# Patient Record
Sex: FEMALE | Race: WHITE | NOT HISPANIC OR LATINO | Employment: PART TIME | URBAN - METROPOLITAN AREA
[De-identification: names, ages, dates, MRNs, and addresses within clinical notes are randomized per-mention and may not be internally consistent; named-entity substitution may affect disease eponyms.]

---

## 2017-01-05 ENCOUNTER — APPOINTMENT (OUTPATIENT)
Dept: PHYSICAL THERAPY | Facility: CLINIC | Age: 57
End: 2017-01-05
Payer: COMMERCIAL

## 2017-01-05 PROCEDURE — 97162 PT EVAL MOD COMPLEX 30 MIN: CPT | Performed by: PHYSICAL THERAPIST

## 2017-01-05 PROCEDURE — 97162 PT EVAL MOD COMPLEX 30 MIN: CPT

## 2017-01-05 PROCEDURE — 97110 THERAPEUTIC EXERCISES: CPT

## 2017-01-10 ENCOUNTER — APPOINTMENT (OUTPATIENT)
Dept: PHYSICAL THERAPY | Facility: CLINIC | Age: 57
End: 2017-01-10
Payer: COMMERCIAL

## 2017-01-10 PROCEDURE — 97110 THERAPEUTIC EXERCISES: CPT

## 2017-01-10 PROCEDURE — 97140 MANUAL THERAPY 1/> REGIONS: CPT

## 2017-01-12 ENCOUNTER — APPOINTMENT (OUTPATIENT)
Dept: PHYSICAL THERAPY | Facility: CLINIC | Age: 57
End: 2017-01-12
Payer: COMMERCIAL

## 2017-01-12 PROCEDURE — 97110 THERAPEUTIC EXERCISES: CPT

## 2017-01-12 PROCEDURE — 97140 MANUAL THERAPY 1/> REGIONS: CPT

## 2017-01-13 ENCOUNTER — HOSPITAL ENCOUNTER (OUTPATIENT)
Dept: RADIOLOGY | Age: 57
Discharge: HOME | End: 2017-01-13
Payer: COMMERCIAL

## 2017-01-13 DIAGNOSIS — Z12.31 ENCOUNTER FOR SCREENING MAMMOGRAM FOR MALIGNANT NEOPLASM OF BREAST: ICD-10-CM

## 2017-01-13 PROCEDURE — G0202 SCR MAMMO BI INCL CAD: HCPCS

## 2017-01-17 ENCOUNTER — APPOINTMENT (OUTPATIENT)
Dept: PHYSICAL THERAPY | Facility: CLINIC | Age: 57
End: 2017-01-17
Payer: COMMERCIAL

## 2017-01-17 PROCEDURE — 97110 THERAPEUTIC EXERCISES: CPT

## 2017-01-17 PROCEDURE — 97140 MANUAL THERAPY 1/> REGIONS: CPT

## 2017-01-19 ENCOUNTER — APPOINTMENT (OUTPATIENT)
Dept: PHYSICAL THERAPY | Facility: CLINIC | Age: 57
End: 2017-01-19
Payer: COMMERCIAL

## 2017-01-19 PROCEDURE — 97110 THERAPEUTIC EXERCISES: CPT

## 2017-01-19 PROCEDURE — 97140 MANUAL THERAPY 1/> REGIONS: CPT

## 2017-01-24 ENCOUNTER — APPOINTMENT (OUTPATIENT)
Dept: PHYSICAL THERAPY | Facility: CLINIC | Age: 57
End: 2017-01-24
Payer: COMMERCIAL

## 2017-01-24 PROCEDURE — 97140 MANUAL THERAPY 1/> REGIONS: CPT

## 2017-01-24 PROCEDURE — 97110 THERAPEUTIC EXERCISES: CPT

## 2017-01-26 ENCOUNTER — APPOINTMENT (OUTPATIENT)
Dept: PHYSICAL THERAPY | Facility: CLINIC | Age: 57
End: 2017-01-26
Payer: COMMERCIAL

## 2017-01-26 PROCEDURE — 97110 THERAPEUTIC EXERCISES: CPT

## 2017-01-26 PROCEDURE — 97140 MANUAL THERAPY 1/> REGIONS: CPT

## 2017-01-31 ENCOUNTER — APPOINTMENT (OUTPATIENT)
Dept: PHYSICAL THERAPY | Facility: CLINIC | Age: 57
End: 2017-01-31
Payer: COMMERCIAL

## 2017-01-31 PROCEDURE — 97140 MANUAL THERAPY 1/> REGIONS: CPT

## 2017-01-31 PROCEDURE — 97110 THERAPEUTIC EXERCISES: CPT

## 2017-02-02 ENCOUNTER — APPOINTMENT (OUTPATIENT)
Dept: PHYSICAL THERAPY | Facility: CLINIC | Age: 57
End: 2017-02-02
Payer: COMMERCIAL

## 2017-02-02 PROCEDURE — 97140 MANUAL THERAPY 1/> REGIONS: CPT

## 2017-02-02 PROCEDURE — 97110 THERAPEUTIC EXERCISES: CPT

## 2018-01-19 ENCOUNTER — HOSPITAL ENCOUNTER (OUTPATIENT)
Dept: RADIOLOGY | Age: 58
Discharge: HOME | End: 2018-01-19
Payer: COMMERCIAL

## 2018-01-19 DIAGNOSIS — Z12.31 ENCOUNTER FOR SCREENING MAMMOGRAM FOR MALIGNANT NEOPLASM OF BREAST: ICD-10-CM

## 2018-01-19 PROCEDURE — 77067 SCR MAMMO BI INCL CAD: CPT

## 2019-01-24 ENCOUNTER — HOSPITAL ENCOUNTER (OUTPATIENT)
Dept: RADIOLOGY | Age: 59
Discharge: HOME | End: 2019-01-24
Payer: COMMERCIAL

## 2019-01-24 VITALS — BODY MASS INDEX: 28.17 KG/M2 | WEIGHT: 165 LBS | HEIGHT: 64 IN

## 2019-01-24 DIAGNOSIS — Z12.31 ENCOUNTER FOR SCREENING MAMMOGRAM FOR MALIGNANT NEOPLASM OF BREAST: ICD-10-CM

## 2019-01-24 PROCEDURE — 77067 SCR MAMMO BI INCL CAD: CPT

## 2020-01-30 ENCOUNTER — HOSPITAL ENCOUNTER (OUTPATIENT)
Dept: RADIOLOGY | Age: 60
Discharge: HOME | End: 2020-01-30
Payer: COMMERCIAL

## 2020-01-30 VITALS — WEIGHT: 165 LBS | HEIGHT: 64 IN | BODY MASS INDEX: 28.17 KG/M2

## 2020-01-30 DIAGNOSIS — Z12.31 ENCOUNTER FOR SCREENING MAMMOGRAM FOR MALIGNANT NEOPLASM OF BREAST: ICD-10-CM

## 2020-01-30 PROCEDURE — 77063 BREAST TOMOSYNTHESIS BI: CPT

## 2020-01-30 PROCEDURE — 77067 SCR MAMMO BI INCL CAD: CPT

## 2020-05-29 ENCOUNTER — TRANSCRIBE ORDERS (OUTPATIENT)
Dept: PHYSICAL THERAPY | Facility: CLINIC | Age: 60
End: 2020-05-29

## 2020-05-29 ENCOUNTER — EVALUATION (OUTPATIENT)
Dept: PHYSICAL THERAPY | Facility: CLINIC | Age: 60
End: 2020-05-29
Payer: COMMERCIAL

## 2020-05-29 DIAGNOSIS — M25.561 RIGHT KNEE PAIN, UNSPECIFIED CHRONICITY: Primary | ICD-10-CM

## 2020-05-29 PROCEDURE — 97110 THERAPEUTIC EXERCISES: CPT | Performed by: PHYSICAL THERAPIST

## 2020-05-29 PROCEDURE — 97161 PT EVAL LOW COMPLEX 20 MIN: CPT | Performed by: PHYSICAL THERAPIST

## 2020-06-03 ENCOUNTER — OFFICE VISIT (OUTPATIENT)
Dept: PHYSICAL THERAPY | Facility: CLINIC | Age: 60
End: 2020-06-03
Payer: COMMERCIAL

## 2020-06-03 DIAGNOSIS — M25.561 RIGHT KNEE PAIN, UNSPECIFIED CHRONICITY: Primary | ICD-10-CM

## 2020-06-03 PROCEDURE — 97112 NEUROMUSCULAR REEDUCATION: CPT | Performed by: PHYSICAL THERAPIST

## 2020-06-03 PROCEDURE — 97110 THERAPEUTIC EXERCISES: CPT | Performed by: PHYSICAL THERAPIST

## 2020-06-03 PROCEDURE — 97140 MANUAL THERAPY 1/> REGIONS: CPT | Performed by: PHYSICAL THERAPIST

## 2020-06-05 ENCOUNTER — OFFICE VISIT (OUTPATIENT)
Dept: PHYSICAL THERAPY | Facility: CLINIC | Age: 60
End: 2020-06-05
Payer: COMMERCIAL

## 2020-06-05 DIAGNOSIS — M25.561 RIGHT KNEE PAIN, UNSPECIFIED CHRONICITY: Primary | ICD-10-CM

## 2020-06-05 PROCEDURE — 97110 THERAPEUTIC EXERCISES: CPT | Performed by: PHYSICAL THERAPIST

## 2020-06-05 PROCEDURE — 97140 MANUAL THERAPY 1/> REGIONS: CPT | Performed by: PHYSICAL THERAPIST

## 2020-06-05 PROCEDURE — 97112 NEUROMUSCULAR REEDUCATION: CPT | Performed by: PHYSICAL THERAPIST

## 2020-06-08 ENCOUNTER — OFFICE VISIT (OUTPATIENT)
Dept: PHYSICAL THERAPY | Facility: CLINIC | Age: 60
End: 2020-06-08
Payer: COMMERCIAL

## 2020-06-08 DIAGNOSIS — M25.561 RIGHT KNEE PAIN, UNSPECIFIED CHRONICITY: Primary | ICD-10-CM

## 2020-06-08 PROCEDURE — 97112 NEUROMUSCULAR REEDUCATION: CPT | Performed by: PHYSICAL THERAPIST

## 2020-06-08 PROCEDURE — 97140 MANUAL THERAPY 1/> REGIONS: CPT | Performed by: PHYSICAL THERAPIST

## 2020-06-08 PROCEDURE — 97110 THERAPEUTIC EXERCISES: CPT | Performed by: PHYSICAL THERAPIST

## 2020-06-11 ENCOUNTER — OFFICE VISIT (OUTPATIENT)
Dept: PHYSICAL THERAPY | Facility: CLINIC | Age: 60
End: 2020-06-11
Payer: COMMERCIAL

## 2020-06-11 DIAGNOSIS — M25.561 RIGHT KNEE PAIN, UNSPECIFIED CHRONICITY: Primary | ICD-10-CM

## 2020-06-11 PROCEDURE — 97140 MANUAL THERAPY 1/> REGIONS: CPT

## 2020-06-11 PROCEDURE — 97110 THERAPEUTIC EXERCISES: CPT

## 2020-06-11 PROCEDURE — 97112 NEUROMUSCULAR REEDUCATION: CPT

## 2020-06-15 ENCOUNTER — OFFICE VISIT (OUTPATIENT)
Dept: PHYSICAL THERAPY | Facility: CLINIC | Age: 60
End: 2020-06-15
Payer: COMMERCIAL

## 2020-06-15 DIAGNOSIS — M25.561 RIGHT KNEE PAIN, UNSPECIFIED CHRONICITY: Primary | ICD-10-CM

## 2020-06-15 PROCEDURE — 97110 THERAPEUTIC EXERCISES: CPT | Performed by: PHYSICAL THERAPIST

## 2020-06-15 PROCEDURE — 97112 NEUROMUSCULAR REEDUCATION: CPT | Performed by: PHYSICAL THERAPIST

## 2020-06-15 PROCEDURE — 97140 MANUAL THERAPY 1/> REGIONS: CPT | Performed by: PHYSICAL THERAPIST

## 2020-06-18 ENCOUNTER — OFFICE VISIT (OUTPATIENT)
Dept: PHYSICAL THERAPY | Facility: CLINIC | Age: 60
End: 2020-06-18
Payer: COMMERCIAL

## 2020-06-18 DIAGNOSIS — M25.561 RIGHT KNEE PAIN, UNSPECIFIED CHRONICITY: Primary | ICD-10-CM

## 2020-06-18 PROCEDURE — 97140 MANUAL THERAPY 1/> REGIONS: CPT | Performed by: PHYSICAL THERAPIST

## 2020-06-18 PROCEDURE — 97110 THERAPEUTIC EXERCISES: CPT | Performed by: PHYSICAL THERAPIST

## 2020-06-18 PROCEDURE — 97116 GAIT TRAINING THERAPY: CPT | Performed by: PHYSICAL THERAPIST

## 2020-06-22 ENCOUNTER — OFFICE VISIT (OUTPATIENT)
Dept: PHYSICAL THERAPY | Facility: CLINIC | Age: 60
End: 2020-06-22
Payer: COMMERCIAL

## 2020-06-22 DIAGNOSIS — M25.561 RIGHT KNEE PAIN, UNSPECIFIED CHRONICITY: Primary | ICD-10-CM

## 2020-06-22 PROCEDURE — 97140 MANUAL THERAPY 1/> REGIONS: CPT | Performed by: PHYSICAL THERAPIST

## 2020-06-22 PROCEDURE — 97110 THERAPEUTIC EXERCISES: CPT | Performed by: PHYSICAL THERAPIST

## 2020-06-22 PROCEDURE — 97112 NEUROMUSCULAR REEDUCATION: CPT | Performed by: PHYSICAL THERAPIST

## 2020-06-25 ENCOUNTER — OFFICE VISIT (OUTPATIENT)
Dept: PHYSICAL THERAPY | Facility: CLINIC | Age: 60
End: 2020-06-25
Payer: COMMERCIAL

## 2020-06-25 ENCOUNTER — TRANSCRIBE ORDERS (OUTPATIENT)
Dept: PHYSICAL THERAPY | Facility: CLINIC | Age: 60
End: 2020-06-25

## 2020-06-25 DIAGNOSIS — M25.561 RIGHT KNEE PAIN, UNSPECIFIED CHRONICITY: Primary | ICD-10-CM

## 2020-06-25 PROCEDURE — 97110 THERAPEUTIC EXERCISES: CPT | Performed by: PHYSICAL THERAPIST

## 2020-06-25 PROCEDURE — 97140 MANUAL THERAPY 1/> REGIONS: CPT | Performed by: PHYSICAL THERAPIST

## 2020-06-25 PROCEDURE — 97112 NEUROMUSCULAR REEDUCATION: CPT | Performed by: PHYSICAL THERAPIST

## 2020-06-29 ENCOUNTER — OFFICE VISIT (OUTPATIENT)
Dept: PHYSICAL THERAPY | Facility: CLINIC | Age: 60
End: 2020-06-29
Payer: COMMERCIAL

## 2020-06-29 DIAGNOSIS — M25.561 RIGHT KNEE PAIN, UNSPECIFIED CHRONICITY: Primary | ICD-10-CM

## 2020-06-29 PROCEDURE — 97112 NEUROMUSCULAR REEDUCATION: CPT | Performed by: PHYSICAL THERAPIST

## 2020-06-29 PROCEDURE — 97140 MANUAL THERAPY 1/> REGIONS: CPT | Performed by: PHYSICAL THERAPIST

## 2020-06-29 PROCEDURE — 97110 THERAPEUTIC EXERCISES: CPT | Performed by: PHYSICAL THERAPIST

## 2020-06-30 NOTE — PROGRESS NOTES
Daily Note     Today's date: 2020  Patient name: Livier Noe  : 1960  MRN: [id number]  Referring provider: Belén Mac MD  Dx:   Encounter Diagnosis     ICD-10-CM    1  Right knee pain, unspecified chronicity M25 561        Start Time: 845  Stop Time: 930  Total time in clinic (min): 45 minutes    Subjective: Pt reports that she is doing well  Patient reports that she is doing a lot better with walking  However, when she gets out of the shower there is a short time where is cramps up and she cant get it straight  Objective: See treatment diary below       Assessment: Patient tolerated treatment well  Patient is 7 weeks post-op this week  Patient continues to be challenged with dynamic stability exercises and shows minimal sway of balance  Patient demonstrated decreased posterior knee tightness during manual treatment which carried over to normal extension ROM  Will continue to progress patient as able  Patient would benefit from continued PT          Plan: Continue per plan of care        Precautions: N/a      Manuals 6/3 6/5 6/8 6/11 6/15 6/18 6/22 6/29 7/2    Knee PROM  10' 10'  10' knee extension Gr II 8' 10'  Knee ext/ and PROM flex 10'  Knee ext/ and PROM flex 5'  Knee ext/ and PROM flex      Patellar mobs       5'       IASTM on posterior knee       5' 5' 5'                 Neuro Re-Ed             [facility] set with strap and towel    2x10 5" hold 2x10 , 5" hold  10x10" hold 30x5" hold      Ankle pumps HEP            Heel slides  HEP  2x10 2x10  2x10         SLS 3x30" 3x30" 3x30" 3x30" 3x30" blk foam  3x30" blk foam 3x30" yellow foam 3x30" biodex foam    Wobble board         1 min ea     Lateral walking on foam beam      10x5#  6x 6x    FWD/BWD on foam beam         6x 6x    Ther Ex             Bike     5 min 5 min 5 min 5 min  5 min  5 min     3-way hip        3x10 ea BTB 3x10 ea BTB 3x10 ea BTB    Sidelying abduction  3x10 GTB 3x10 GTB 3x10 GTB  3x10 3x10 GTB 3x10 BTB       Sidelying Add             Prone hip extension 2x10  GTB 3x10 GTB 3x10 GTB  3x10 3x10 GTB 3x10 BTB       SLR with Quad set  HEP  3x10  2# 3x10  3# 3x10  3# 0# 3x10 3x10  3#        TKE 2x10 GTB 2x10 GTB 2x10 GTB No TB 15x3" 2x10 GTB 2x10 GTB 2x10 5# Sri 3x10 10# [place] 3x10 15# [place]    Leg press  NV 2x10 #10 single leg NV 2x10 #10 single leg 2x10 #10 single leg 2x10 #15 single leg 3x10 #15 single leg 3x10 #15 single leg    Hamstring Stretch    2x30" 2x30"         Bridges     2x10 GTB        Hamstring curls    2x10 3# standing 2x10, 3# standing  Prone with BTB 3x10  Machine 22# 3x10  Machine 22# 3x10  Machine 22# 3x10    Heel raises             Ther Activity             Step-taps              Step-ups  2x10  8" step    8" 2x10         Mini squats  10x 10x 10x 10x 10x  10x wall squats  10x wall squats with RKB    Gait Training             Pre-gait hurdles       20x   Yovany with knee ext emphasis 20x   Yovany with TKE GTB 20x   Yovany with TKE GTB 20x   Yovany with TKE GTB                 Modalities             ice

## 2020-07-02 ENCOUNTER — OFFICE VISIT (OUTPATIENT)
Dept: PHYSICAL THERAPY | Facility: CLINIC | Age: 60
End: 2020-07-02
Payer: COMMERCIAL

## 2020-07-02 DIAGNOSIS — M25.561 RIGHT KNEE PAIN, UNSPECIFIED CHRONICITY: Primary | ICD-10-CM

## 2020-07-02 PROCEDURE — 97112 NEUROMUSCULAR REEDUCATION: CPT | Performed by: PHYSICAL THERAPIST

## 2020-07-02 PROCEDURE — 97140 MANUAL THERAPY 1/> REGIONS: CPT | Performed by: PHYSICAL THERAPIST

## 2020-07-02 PROCEDURE — 97110 THERAPEUTIC EXERCISES: CPT | Performed by: PHYSICAL THERAPIST

## 2020-07-06 NOTE — PROGRESS NOTES
Daily Note     Today's date: 2020  Patient name: Aguila Betancourt  : 1960  MRN: [id number]  Referring provider: Lizzie Salas MD  Dx:   Encounter Diagnosis     ICD-10-CM    1  Right knee pain, unspecified chronicity M25 561        Start Time: 1700  Stop Time: 1745  Total time in clinic (min): 45 minutes    Subjective: Pt reports that she was at the [facility] today and she got out of the chair and had an incidence of buckling  Patient didn't have any increase in pain but states that that has not happened for a while  Objective: See treatment diary below       Assessment: Patient tolerated treatment well  Patient is 8 weeks post-op this week  Patient continues to be challenged with dynamic stability exercises and shows minimal sway of balance with mobile tasks on mobile surfaces  Patient responded well to progressions with exercises well as introduction of new exercises challenging knee stability and strength  Patient noted decreased pain and discomfort post manual treatment  Will continue to progress patient as able  Patient would benefit from continued PT          Plan: Continue per plan of care        Precautions: N/a      Manuals 6/3 6/5 6/8 6/11 6/15 6/18 6/22 6/29 7/2 7/7   Knee PROM  10' 10'  10' knee extension Gr II 8' 10'  Knee ext/ and PROM flex 10'  Knee ext/ and PROM flex 5'  Knee ext/ and PROM flex      Patellar mobs       5'       IASTM on posterior knee       5' 5' 5' 10'                Neuro Re-Ed             [facility] set with strap and towel    2x10 5" hold 2x10 , 5" hold  10x10" hold 30x5" hold      Ankle pumps HEP            Heel slides  HEP  2x10 2x10  2x10         SLS 3x30" 3x30" 3x30" 3x30" 3x30" blk foam  3x30" blk foam 3x30" yellow foam 3x30" biodex foam 3x30" biodex foam   Wobble board         1 min ea  1 min ea   Lateral walking on foam beam      10x5#  6x 6x 6x with cone taps    FWD/BWD on foam beam         6x 6x 6x   Ther Ex             Bike     5 min 5 min 5 min 5 min  5 min  5 min  5 min    3-way hip        3x10 ea BTB 3x10 ea BTB 3x10 ea BTB 3x10 ea BTB   Sidelying abduction  3x10 GTB 3x10 GTB 3x10 GTB  3x10 3x10 GTB 3x10 BTB       Sidelying Add             Prone hip extension 2x10  GTB 3x10 GTB 3x10 GTB  3x10 3x10 GTB 3x10 BTB       SLR with Quad set  HEP  3x10  2# 3x10  3# 3x10  3# 0# 3x10 3x10  3#        TKE 2x10 GTB 2x10 GTB 2x10 GTB No TB 15x3" 2x10 GTB 2x10 GTB 2x10 5# [place] 3x10 10# [place] 3x10 15# Sri 3x10 15# Sri   Leg press  NV 2x10 #10 single leg NV 2x10 #10 single leg 2x10 #10 single leg 2x10 #15 single leg 3x10 #15 single leg 3x10 #15 single leg 3x10 #15 single leg   Hamstring Stretch    2x30" 2x30"         Bridges     2x10 GTB        Hamstring curls    2x10 3# standing 2x10, 3# standing  Prone with BTB 3x10  Machine 22# 3x10  Machine 22# 3x10  Machine 22# 3x10 Machine 22# 3x10   Heel raises             Slider Lunges           2x10 FWD/LAT   Ther Activity             Step-taps              Step-ups  2x10  8" step    8" 2x10         Mini squats  10x 10x 10x 10x 10x  10x wall squats  10x wall squats with RKB 2x10 wall squats with GKB   Gait Training             Pre-gait hurdles       20x   Yovany with knee ext emphasis 20x   Yovany with TKE GTB 20x   Yovany with TKE GTB 20x   Yovany with TKE GTB                 Modalities             ice

## 2020-07-07 ENCOUNTER — OFFICE VISIT (OUTPATIENT)
Dept: PHYSICAL THERAPY | Facility: CLINIC | Age: 60
End: 2020-07-07
Payer: COMMERCIAL

## 2020-07-07 DIAGNOSIS — M25.561 RIGHT KNEE PAIN, UNSPECIFIED CHRONICITY: Primary | ICD-10-CM

## 2020-07-07 PROCEDURE — 97110 THERAPEUTIC EXERCISES: CPT | Performed by: PHYSICAL THERAPIST

## 2020-07-07 PROCEDURE — 97112 NEUROMUSCULAR REEDUCATION: CPT | Performed by: PHYSICAL THERAPIST

## 2020-07-07 PROCEDURE — 97140 MANUAL THERAPY 1/> REGIONS: CPT | Performed by: PHYSICAL THERAPIST

## 2020-07-10 ENCOUNTER — OFFICE VISIT (OUTPATIENT)
Dept: PHYSICAL THERAPY | Facility: CLINIC | Age: 60
End: 2020-07-10
Payer: COMMERCIAL

## 2020-07-10 DIAGNOSIS — M25.561 RIGHT KNEE PAIN, UNSPECIFIED CHRONICITY: Primary | ICD-10-CM

## 2020-07-10 PROCEDURE — 97110 THERAPEUTIC EXERCISES: CPT | Performed by: PHYSICAL THERAPIST

## 2020-07-10 PROCEDURE — 97112 NEUROMUSCULAR REEDUCATION: CPT | Performed by: PHYSICAL THERAPIST

## 2020-07-10 PROCEDURE — 97530 THERAPEUTIC ACTIVITIES: CPT | Performed by: PHYSICAL THERAPIST

## 2020-07-10 NOTE — PROGRESS NOTES
Daily Note     Today's date: 7/10/2020  Patient name: Americo Lemus  : 1960  MRN: [id number]  Referring provider: Job Quintanilla MD  Dx:   Encounter Diagnosis     ICD-10-CM    1  Right knee pain, unspecified chronicity M25 561        Start Time: 0900  Stop Time: 0945  Total time in clinic (min): 45 minutes    Subjective: Pt reports that she did have another instance of buckling yesterday again when she was getting up from a chair  Objective: See treatment diary below       Assessment: Patient tolerated treatment well  Patient is 8 weeks post-op this week  Patient noted decreased pain and discomfort post manual treatment  Will continue to progress patient as able  Patient would benefit from continued PT          Plan: Continue per plan of care        Precautions: N/a      Manuals 7/10      6/22 6/29 7/2 7/7   Knee PROM        5'  Knee ext/ and PROM flex      Patellar mobs              IASTM on posterior knee 8'      5' 5' 5' 10'                Neuro Re-Ed             [employer] set with strap and towel        30x5" hold      Ankle pumps             Heel slides              SLS 3x30" biodex foam      3x30" blk foam 3x30" yellow foam 3x30" biodex foam 3x30" biodex foam   Wobble board         1 min ea  1 min ea   Lateral walking on foam beam 6x with cone taps        6x 6x 6x with cone taps    FWD/BWD on foam beam         6x 6x 6x   Ther Ex             Bike  5 min       5 min  5 min  5 min  5 min    3-way hip  3x10 ea BTB      3x10 ea BTB 3x10 ea BTB 3x10 ea BTB 3x10 ea BTB   Sidelying abduction  3x30" biodex foam            SLR with Quad set              TKE 3x10 15# Sri      2x10 5# Sri 3x10 10# [place] 3x10 15# [place] 3x10 15# Sri   Leg press 3x10 #15 single leg      2x10 #15 single leg 3x10 #15 single leg 3x10 #15 single leg 3x10 #15 single leg   Hamstring Stretch  2x30"            Bridges             Hamstring curls  Machine 22# 3x10      Machine 22# 3x10  Machine 22# 3x10 Machine 22# 3x10 Machine 22# 3x10   Heel raises Single leg 30x             Slider Lunges  2x10 FWD/LAT         2x10 FWD/LAT   Side stepping with TB 4x BTB            Ther Activity             Step-taps              Step-ups              Mini squats  2x10 wall squats with GKB      10x wall squats  10x wall squats with RKB 2x10 wall squats with GKB   Gait Training             Pre-gait hurdles        20x   Yovany with TKE GTB 20x   Yovany with TKE GTB 20x   Yovany with TKE GTB                 Modalities             ice

## 2020-07-21 ENCOUNTER — OFFICE VISIT (OUTPATIENT)
Dept: PHYSICAL THERAPY | Facility: CLINIC | Age: 60
End: 2020-07-21
Payer: COMMERCIAL

## 2020-07-21 DIAGNOSIS — M25.561 RIGHT KNEE PAIN, UNSPECIFIED CHRONICITY: Primary | ICD-10-CM

## 2020-07-21 PROCEDURE — 97112 NEUROMUSCULAR REEDUCATION: CPT | Performed by: PHYSICAL THERAPIST

## 2020-07-21 PROCEDURE — 97110 THERAPEUTIC EXERCISES: CPT | Performed by: PHYSICAL THERAPIST

## 2020-07-21 PROCEDURE — 97140 MANUAL THERAPY 1/> REGIONS: CPT | Performed by: PHYSICAL THERAPIST

## 2020-07-21 NOTE — PROGRESS NOTES
Daily Note     Today's date: 2020  Patient name: David Tolentino  : 1960  MRN: [id number]  Referring provider: Rusty Dukes MD  Dx:   Encounter Diagnosis     ICD-10-CM    1  Right knee pain, unspecified chronicity M25 561        Start Time: 09  Stop Time: 945  Total time in clinic (min): 45 minutes    Subjective: Patient is back to therapy from 1 week break secondary to being on vacation  Patient notes that her knee held up well but still continues to have occasional locking in the the knee and then feels a pop posteriorly and then it is fine  She does have some pain when this happens  Objective: See treatment diary below       Assessment: Patient tolerated treatment well  Patient responded well to progressions with exercises this date  Patient had minimal complaints of pain throughout session  Patient did demonstrate tightness in the posterior knee causing her to have trouble with terminal knee extension during ambulation on the walk out  Patient does demonstrate calf and posterior hamstring tendon tightness which may be contributing to this issue  Will continue to progress patient as able and monitor symptoms  Patient would benefit from continued PT          Plan: Continue per plan of care        Precautions: N/a      Manuals 7/10 7/20     6/22 6/29 7/2 7/7   Knee PROM   Distraction 4'     5'  Knee ext/ and PROM flex      Patellar mobs              IASTM on posterior knee 8' 4'     5' 5' 5' 10'                Neuro Re-Ed             [employer] set with strap and towel        30x5" hold      Ankle pumps             Heel slides              SLS 3x30" biodex foam 3x30" biodex foam with GTB TKE     3x30" blk foam 3x30" yellow foam 3x30" biodex foam 3x30" biodex foam   Wobble board         1 min ea  1 min ea   Lateral walking on foam beam 6x with cone taps        6x 6x 6x with cone taps    FWD/BWD on foam beam         6x 6x 6x   Ther Ex             Bike  5 min  5 min      5 min  5 min  5 min  5 min    3-way hip  3x10 ea BTB  HEP      3x10 ea BTB 3x10 ea BTB 3x10 ea BTB 3x10 ea BTB   Sidelying abduction              SLR with Quad set              TKE 3x10 15# Sri 3x10 15# [place]     2x10 5# [place] 3x10 10# [place] 3x10 15# [place] 3x10 15# [place]   Leg press 3x10 #15 single leg 3x10 #20 single leg     2x10 #15 single leg 3x10 #15 single leg 3x10 #15 single leg 3x10 #15 single leg   Hamstring Stretch  2x30"            Bridges             Hamstring curls  Machine 22# 3x10 Machine 22# 3x10     Machine 22# 3x10  Machine 22# 3x10  Machine 22# 3x10 Machine 22# 3x10   Heel raises Single leg 30x  Single leg 30x            Slider Lunges  2x10 FWD/LAT 2x10 FWD/LAT        2x10 FWD/LAT   Side stepping with TB 4x BTB            Knee Ext machine   3x10   11# single leg            Ther Activity             Step-taps              Step-ups              Mini squats  2x10 wall squats with GKB 2x10 wall squats with GKB     10x wall squats  10x wall squats with RKB 2x10 wall squats with GKB   Gait Training             Pre-gait hurdles        20x   Yovany with TKE GTB 20x   Yovany with TKE GTB 20x   Yovany with TKE GTB                 Modalities             ice

## 2020-07-24 ENCOUNTER — OFFICE VISIT (OUTPATIENT)
Dept: PHYSICAL THERAPY | Facility: CLINIC | Age: 60
End: 2020-07-24
Payer: COMMERCIAL

## 2020-07-24 DIAGNOSIS — M25.561 RIGHT KNEE PAIN, UNSPECIFIED CHRONICITY: Primary | ICD-10-CM

## 2020-07-24 PROCEDURE — 97112 NEUROMUSCULAR REEDUCATION: CPT | Performed by: PHYSICAL THERAPIST

## 2020-07-24 PROCEDURE — 97140 MANUAL THERAPY 1/> REGIONS: CPT | Performed by: PHYSICAL THERAPIST

## 2020-07-24 PROCEDURE — 97110 THERAPEUTIC EXERCISES: CPT | Performed by: PHYSICAL THERAPIST

## 2020-07-24 NOTE — PROGRESS NOTES
Daily Note     Today's date: 2020  Patient name: Eduarda Ospina  : 1960  MRN: [id number]  Referring provider: Sravanthi Olson MD  Dx:   Encounter Diagnosis     ICD-10-CM    1  Right knee pain, unspecified chronicity M25 561        Start Time: 09  Stop Time: 945  Total time in clinic (min): 45 minutes    Subjective: Patient notes she had once instance of locking up when going up the stairs but other than that its feeling good  Objective: See treatment diary below       Assessment: Patient tolerated treatment well  Incorporated new exercises to promote extension and decrease tightness in the posterior knee  Patient had minimal complaints of pain throughout session  Patient noted decreased pain and discomfort post manual treatment  Will continue to progress patient as able and monitor symptoms  Updated HEP this date  Patient would benefit from continued PT          Plan: Continue per plan of care        Precautions: N/a      Manuals 7/10 7/20 7/24    6/22 6/29 7/2 7/7   Knee PROM   Distraction 4' Distraction 4'    5'  Knee ext/ and PROM flex      Patellar mobs              IASTM on posterior knee 8' 4' 4'    5' 5' 5' 10'                Neuro Re-Ed             [employer] set with strap and towel        30x5" hold      Ankle pumps             Heel slides              SLS 3x30" biodex foam 3x30" biodex foam with GTB TKE     3x30" blk foam 3x30" yellow foam 3x30" biodex foam 3x30" biodex foam   Wobble board         1 min ea  1 min ea   Lateral walking on foam beam 6x with cone taps        6x 6x 6x with cone taps    FWD/BWD on foam beam         6x 6x 6x   Ther Ex             Bike  5 min  5 min  5 min    5 min  5 min  5 min  5 min    3-way hip  3x10 ea BTB  HEP      3x10 ea BTB 3x10 ea BTB 3x10 ea BTB 3x10 ea BTB   Sidelying abduction              SLR with Quad set              TKE 3x10 15# Sri 3x10 15# [place] 3x10 15# [place]    2x10 5# Sri 3x10 10# Sri 3x10 15# [place] 3x10 15# [place]   Leg press 3x10 #15 single leg 3x10 #20 single leg 3x10 #25 single leg    2x10 #15 single leg 3x10 #15 single leg 3x10 #15 single leg 3x10 #15 single leg   Hamstring Stretch  2x30"            Bridges             Hamstring curls  Machine 22# 3x10 Machine 22# 3x10 Machine 22# 3x10    Machine 22# 3x10  Machine 22# 3x10  Machine 22# 3x10 Machine 22# 3x10   Heel raises Single leg 30x  Single leg 30x            Slider Lunges  2x10 FWD/LAT 2x10 FWD/LAT 2x10 FWD/LAT       2x10 FWD/LAT   Side stepping with TB 4x BTB            Knee Ext machine   3x10   11# single leg  3x10   11# single leg           Stretch Board calf stretch    3x30"          Stairs eccentric stretch   3x30"          Heel walking    8x          Ther Activity             Step-taps              Step-ups    10" step 20x           Mini squats  2x10 wall squats with GKB 2x10 wall squats with GKB 2x10 wall squats with GKB    10x wall squats  10x wall squats with RKB 2x10 wall squats with GKB   Gait Training             Pre-gait hurdles        20x   Yovany with TKE GTB 20x   Yovany with TKE GTB 20x   Yovany with TKE GTB                 Modalities             ice

## 2020-07-27 ENCOUNTER — TRANSCRIBE ORDERS (OUTPATIENT)
Dept: PHYSICAL THERAPY | Facility: CLINIC | Age: 60
End: 2020-07-27

## 2020-07-27 ENCOUNTER — OFFICE VISIT (OUTPATIENT)
Dept: PHYSICAL THERAPY | Facility: CLINIC | Age: 60
End: 2020-07-27
Payer: COMMERCIAL

## 2020-07-27 DIAGNOSIS — M25.561 RIGHT KNEE PAIN, UNSPECIFIED CHRONICITY: Primary | ICD-10-CM

## 2020-07-27 PROCEDURE — 97112 NEUROMUSCULAR REEDUCATION: CPT | Performed by: PHYSICAL THERAPIST

## 2020-07-27 PROCEDURE — 97110 THERAPEUTIC EXERCISES: CPT | Performed by: PHYSICAL THERAPIST

## 2020-07-27 PROCEDURE — 97140 MANUAL THERAPY 1/> REGIONS: CPT | Performed by: PHYSICAL THERAPIST

## 2020-07-27 NOTE — LETTER
2020    Theresa Otto MD  [address]  [facility]  [address]     Patient: Chaim Shepherd   YOB: 1960   Date of Visit: 2020     Encounter Diagnosis     ICD-10-CM    1  Right knee pain, unspecified chronicity M25 561        Dear Dr Rowena Khan:    Thank you for your recent referral of Chaim Shepherd  Please review the attached evaluation summary from Swetha's recent visit  Please verify that you agree with the plan of care by signing the attached order  If you have any questions or concerns, please do not hesitate to call  I sincerely appreciate the opportunity to share in the care of one of your patients and hope to have another opportunity to work with you in the near future  Sincerely,    Jai Burks PT      Referring Provider:      I certify that I have read the below Plan of Care and certify the need for these services furnished under this plan of treatment while under my care  Theresa Otto MD  [address]  [facility]  [facility]: [phone]          Daily Note     Today's date: 2020  Patient name: Chaim Shepherd  : 1960  MRN: [id number]  Referring provider: Aleksandra Walton MD  Dx:   Encounter Diagnosis     ICD-10-CM    1  Right knee pain, unspecified chronicity M25 561        Start Time: 1400  Stop Time: 1445  Total time in clinic (min): 45 minutes    Subjective:  Patient notes that she feels she is  95% improved since the start of therapy  Patient notes that she has improved with her walking mechanics, increase flexibility in the knee, and overall decrease in pain  Patient notes that she occasionally has moments of locking and then she has trouble making it straight  Patient reports that when this happens that's when she has the most pain   Patient notes that she is still doing stairs step-to step secondary to  atient notes that she still has trouble with performing steps specifically going down reciprocally  Pain  Current pain ratin  At best pain ratin  At worst pain ratin-5/10  Location: Global R knee     Objective: See treatment diary below    Active Range of Motion   Left Knee   Normal active range of motion     Right Knee   Flexion: 128 degrees   Extensor la-1 degrees  (but has points in time where the knee locks up and has -5 degrees)      Passive Range of Motion      Right Knee   Flexion: 130 degrees   Extension: 0 degrees      Strength/Myotome Testing      Right Hip   Planes of Motion   Flexion: 4+  Extension: 4+  Abduction: 4+     Isolated Muscles   Gluteus maximums: 4+  Gluteus medius: 4+     Left Knee   Flexion: 4+  Extension: 4+     Right Knee   Normal strength  Flexion: 4 +  Extension: 4 +  Quadriceps contraction: good     Left Ankle/Foot   Dorsiflexion: 4+  Plantar flexion: 4+     Right Ankle/Foot   Dorsiflexion: 4+  Plantar flexion: 4+     Gait observation: Patient demonstrates a nearly normalized gait with only slight impairments this date except when the knee locks up  Assessment:  Upon evaluation, patient demonstrated good improvements with both knee flexion and extension ROM  Patient however patient continues to have tightness in the posterior knee especially after max flexion as well as occasionally throughout the day  Patient has occasions when the knee "locks" and gets tight in 5 degrees of flexion and it takes some stretching to loosen up for her to get full terminal knee extension  Patient demonstrated good quad specifically in the weight bearing activities  Patient demonstrates a nearly normalized gait with only slight impairments this date except when the knee locks up  Patient has met all of her short term goals and is progressing appropriately toward long term goals  Patient scored a 89 on FOTO indicating improvements with overall function   Patient would benefit from continued skilled physical therapy to address the impairments, improve their level of function, and to improve their overall quality of life  Plan  Plan details: 2x week for  2 weeks  Patient would benefit from: PT eval and skilled physical therapy  Planned therapy interventions: manual therapy, neuromuscular re-education, patient education, therapeutic activities, therapeutic exercise and home exercise program  Treatment plan discussed with: patient     Goals  Short Term Goals: to be achieved by 4 weeks  1) Patient to be independent with basic HEP  MET  2) Decrease pain to 4/10 at its worst  PARTIALLY MET   3) Increase knee ROM by 5-10 degrees  MET  4) Demonstrated good quad control and normal knee flexion/extension ROM  ME T    Long Term Goals: to be achieved by discharge  1) FOTO equal to or greater than target score indicating improvements with overall function  MET  2) Ambulation to improve to maximal level of function   MET   3) Stair negotiation will improve to reciprocal  PARTIALLY MET   4) Patient to be independent in comprehensive HEP  PARTIALLY MET      Precautions: N/a      Manuals 7/10 7/20 7/24 7/27   6/22 6/29 7/2 7/7   Knee PROM   Distraction 4' Distraction 4' Distraction 4'   5'  Knee ext/ and PROM flex      Patellar mobs              IASTM on posterior knee 8' 4' 4' 4'   5' 5' 5' 10'                Neuro Re-Ed             [employer] set with strap and towel        30x5" hold      Ankle pumps             Heel slides              SLS 3x30" biodex foam 3x30" biodex foam with GTB TKE  3x30" biodex foam with GTB TKE   3x30" blk foam 3x30" yellow foam 3x30" biodex foam 3x30" biodex foam   Wobble board         1 min ea  1 min ea   Lateral walking on foam beam 6x with cone taps    6x with cone taps     6x 6x 6x with cone taps    FWD/BWD on foam beam         6x 6x 6x   Ther Ex             Bike  5 min  5 min  5 min 5 min    5 min  5 min  5 min  5 min    3-way hip  3x10 ea BTB  HEP      3x10 ea BTB 3x10 ea BTB 3x10 ea BTB 3x10 ea BTB Sidelying abduction              SLR with Quad set              TKE 3x10 15# [place] 3x10 15# [place] 3x10 15# [place] 3x10 15# Sri   2x10 5# Sri 3x10 10# Sri 3x10 15# [place] 3x10 15# Sri   Leg press 3x10 #15 single leg 3x10 #20 single leg 3x10 #25 single leg 3x10 #25 single leg   2x10 #15 single leg 3x10 #15 single leg 3x10 #15 single leg 3x10 #15 single leg   Hamstring Stretch  2x30"            Bridges             Hamstring curls  Machine 22# 3x10 Machine 22# 3x10 Machine 22# 3x10 Machine 33# 3x10   Machine 22# 3x10  Machine 22# 3x10  Machine 22# 3x10 Machine 22# 3x10   Heel raises Single leg 30x  Single leg 30x            Slider Lunges  2x10 FWD/LAT 2x10 FWD/LAT 2x10 FWD/LAT 2x10 FWD/LAT      2x10 FWD/LAT   Side stepping with TB 4x BTB            Knee Ext machine   3x10   11# single leg  3x10   11# single leg  3x10   11# single leg          Stretch Board calf stretch    3x30" 3x30"         Stairs eccentric stretch   3x30"          Heel walking    8x          Ther Activity             Step-taps              Step-ups    10" step 20x  10" step 20x          Mini squats  2x10 wall squats with GKB 2x10 wall squats with GKB 2x10 wall squats with GKB 2x10 wall squats with GKB   10x wall squats  10x wall squats with RKB 2x10 wall squats with GKB   Gait Training             Pre-gait hurdles        20x   Yovany with TKE GTB 20x   Yovany with TKE GTB 20x   Yovany with TKE GTB                 Modalities             ice

## 2020-07-27 NOTE — PROGRESS NOTES
Daily Note     Today's date: 2020  Patient name: Brandon Fallon  : 1960  MRN: [id number]  Referring provider: Varinder Marie MD  Dx:   Encounter Diagnosis     ICD-10-CM    1  Right knee pain, unspecified chronicity M25 561        Start Time: 1400  Stop Time: 1445  Total time in clinic (min): 45 minutes    Subjective:  Patient notes that she feels she is 95% improved since the start of therapy  Patient notes that she has improved with her walking mechanics, increase flexibility in the knee, and overall decrease in pain  Patient notes that she occasionally has moments of locking and then she has trouble making it straight  Patient reports that when this happens that's when she has the most pain  Patient notes that she is still doing stairs step-to step secondary to  atient notes that she still has trouble with performing steps specifically going down reciprocally  Pain  Current pain ratin  At best pain ratin  At worst pain ratin-5/10  Location: Global R knee     Objective: See treatment diary below    Active Range of Motion   Left Knee   Normal active range of motion     Right Knee   Flexion: 128 degrees   Extensor la-1 degrees (but has points in time where the knee locks up and has -5 degrees)      Passive Range of Motion      Right Knee   Flexion: 130 degrees   Extension: 0 degrees      Strength/Myotome Testing      Right Hip   Planes of Motion   Flexion: 4+  Extension: 4+  Abduction: 4+     Isolated Muscles   Gluteus maximums: 4+  Gluteus medius: 4+     Left Knee   Flexion: 4+  Extension: 4+     Right Knee   Normal strength  Flexion: 4+  Extension: 4+  Quadriceps contraction: good     Left Ankle/Foot   Dorsiflexion: 4+  Plantar flexion: 4+     Right Ankle/Foot   Dorsiflexion: 4+  Plantar flexion: 4+     Gait observation: Patient demonstrates a nearly normalized gait with only slight impairments this date except when the knee locks up          Assessment:  Upon evaluation, patient demonstrated good improvements with both knee flexion and extension ROM  Patient however patient continues to have tightness in the posterior knee especially after max flexion as well as occasionally throughout the day  Patient has occasions when the knee "locks" and gets tight in 5 degrees of flexion and it takes some stretching to loosen up for her to get full terminal knee extension  Patient demonstrated good quad specifically in the weight bearing activities  Patient demonstrates a nearly normalized gait with only slight impairments this date except when the knee locks up  Patient has met all of her short term goals and is progressing appropriately toward long term goals  Patient scored a 89 on FOTO indicating improvements with overall function  Patient would benefit from continued skilled physical therapy to address the impairments, improve their level of function, and to improve their overall quality of life  Plan  Plan details: 2x week for 2 weeks  Patient would benefit from: PT eval and skilled physical therapy  Planned therapy interventions: manual therapy, neuromuscular re-education, patient education, therapeutic activities, therapeutic exercise and home exercise program  Treatment plan discussed with: patient     Goals  Short Term Goals: to be achieved by 4 weeks  1) Patient to be independent with basic HEP  MET  2) Decrease pain to 4/10 at its worst  PARTIALLY MET   3) Increase knee ROM by 5-10 degrees  MET  4) Demonstrated good quad control and normal knee flexion/extension ROM  MET    Long Term Goals: to be achieved by discharge  1) FOTO equal to or greater than target score indicating improvements with overall function  MET  2) Ambulation to improve to maximal level of function   MET   3) Stair negotiation will improve to reciprocal  PARTIALLY MET   4) Patient to be independent in comprehensive HEP  PARTIALLY MET      Precautions: N/a      Manuals 7/10 7/20 7/24 7/27   6/22 6/29 7/2 7/7   Knee PROM Distraction 4' Distraction 4' Distraction 4'   5'  Knee ext/ and PROM flex      Patellar mobs              IASTM on posterior knee 8' 4' 4' 4'   5' 5' 5' 10'                Neuro Re-Ed             [employer] set with strap and towel        30x5" hold      Ankle pumps             Heel slides              SLS 3x30" biodex foam 3x30" biodex foam with GTB TKE  3x30" biodex foam with GTB TKE   3x30" blk foam 3x30" yellow foam 3x30" biodex foam 3x30" biodex foam   Wobble board         1 min ea  1 min ea   Lateral walking on foam beam 6x with cone taps    6x with cone taps     6x 6x 6x with cone taps    FWD/BWD on foam beam         6x 6x 6x   Ther Ex             Bike  5 min  5 min  5 min 5 min    5 min  5 min  5 min  5 min    3-way hip  3x10 ea BTB  HEP      3x10 ea BTB 3x10 ea BTB 3x10 ea BTB 3x10 ea BTB   Sidelying abduction              SLR with Quad set              TKE 3x10 15# [place] 3x10 15# [place] 3x10 15# Sri 3x10 15# Sri   2x10 5# Sri 3x10 10# Sri 3x10 15# [place] 3x10 15# [place]   Leg press 3x10 #15 single leg 3x10 #20 single leg 3x10 #25 single leg 3x10 #25 single leg   2x10 #15 single leg 3x10 #15 single leg 3x10 #15 single leg 3x10 #15 single leg   Hamstring Stretch  2x30"            Bridges             Hamstring curls  Machine 22# 3x10 Machine 22# 3x10 Machine 22# 3x10 Machine 33# 3x10   Machine 22# 3x10  Machine 22# 3x10  Machine 22# 3x10 Machine 22# 3x10   Heel raises Single leg 30x  Single leg 30x            Slider Lunges  2x10 FWD/LAT 2x10 FWD/LAT 2x10 FWD/LAT 2x10 FWD/LAT      2x10 FWD/LAT   Side stepping with TB 4x BTB            Knee Ext machine   3x10   11# single leg  3x10   11# single leg  3x10   11# single leg          Stretch Board calf stretch    3x30" 3x30"         Stairs eccentric stretch   3x30"          Heel walking    8x          Ther Activity             Step-taps              Step-ups    10" step 20x  10" step 20x          Mini squats  2x10 wall squats with GKB 2x10 wall squats with GKB 2x10 wall squats with GKB 2x10 wall squats with GKB   10x wall squats  10x wall squats with RKB 2x10 wall squats with GKB   Gait Training             Pre-gait hurdles        20x   Yovany with TKE GTB 20x   Yovany with TKE GTB 20x   Yovany with TKE GTB                 Modalities             ice

## 2020-07-31 ENCOUNTER — OFFICE VISIT (OUTPATIENT)
Dept: PHYSICAL THERAPY | Facility: CLINIC | Age: 60
End: 2020-07-31
Payer: COMMERCIAL

## 2020-07-31 DIAGNOSIS — M25.561 RIGHT KNEE PAIN, UNSPECIFIED CHRONICITY: Primary | ICD-10-CM

## 2020-07-31 PROCEDURE — 97140 MANUAL THERAPY 1/> REGIONS: CPT

## 2020-07-31 PROCEDURE — 97112 NEUROMUSCULAR REEDUCATION: CPT

## 2020-07-31 PROCEDURE — 97110 THERAPEUTIC EXERCISES: CPT

## 2020-07-31 NOTE — PROGRESS NOTES
Daily Note     Today's date: 2020  Patient name: Manuel Baker  : 1960  MRN: [id number]  Referring provider: Roney Aparicio MD  Dx:   Encounter Diagnosis     ICD-10-CM    1  Right knee pain, unspecified chronicity M25 561         1:1 with PTA CR 10:40- 11:35  Subjective:  Knee continues to randomly lock with inability to fully extend post        Objective: See treatment diary below        Assessment: Progressed through  [facility] [address] without complaints  Pain free manuals  Patients knee did lock x1 during treatment but relieved with leg pull  Plan: Continue POC          Precautions: N/a      Manuals 7/10 7/20 7/24 7/27 7/31  6/22 6/29 7/2 7/7   Knee PROM   Distraction 4' Distraction 4' Distraction 4' Leg pull  4 mins  5'  Knee ext/ and PROM flex      Patellar mobs              IASTM on posterior knee 8' 4' 4' 4' 6 mins  5' 5' 5' 10'                Neuro Re-Ed             [employer] set with strap and towel        30x5" hold      Ankle pumps             Heel slides              SLS 3x30" biodex foam 3x30" biodex foam with GTB TKE  3x30" biodex foam with GTB TKE 30"x3  biodex foam with GTB TKE  3x30" blk foam 3x30" yellow foam 3x30" biodex foam 3x30" biodex foam   Wobble board         1 min ea  1 min ea   Lateral walking on foam beam 6x with cone taps    6x with cone taps  6x with cone taps   6x 6x 6x with cone taps    FWD/BWD on foam beam         6x 6x 6x   Ther Ex             Bike  5 min  5 min  5 min 5 min  NuStep  L3   5 mins    5 min  5 min  5 min  5 min    3-way hip  3x10 ea BTB  HEP      3x10 ea BTB 3x10 ea BTB 3x10 ea BTB 3x10 ea BTB   Sidelying abduction              SLR with Quad set              TKE 3x10 15# Sri 3x10 15# Sri 3x10 15# Sri 3x10 15# Sri 15# 3x10  sri  2x10 5# [place] 3x10 10# Anna 3x10 15# Anna 3x10 15# Anna   Leg press 3x10 #15 single leg 3x10 #20 single leg 3x10 #25 single leg 3x10 #25 single leg 3x10 #25  Singe leg  2x10 #15 single leg 3x10 #15 single leg 3x10 #15 single leg 3x10 #15 single leg   Hamstring Stretch  2x30"            Bridges             Hamstring curls  Machine 22# 3x10 Machine 22# 3x10 Machine 22# 3x10 Machine 33# 3x10 Machine  33# 3x10  Machine 22# 3x10  Machine 22# 3x10  Machine 22# 3x10 Machine 22# 3x10   Heel raises Single leg 30x  Single leg 30x            Slider Lunges  2x10 FWD/LAT 2x10 FWD/LAT 2x10 FWD/LAT 2x10 FWD/LAT B/L  2x10  Fwd/lat     2x10 FWD/LAT   Side stepping with TB 4x BTB            Knee Ext machine   3x10   11# single leg  3x10   11# single leg  3x10   11# single leg  3x10  11# single leg        Stretch Board calf stretch    3x30" 3x30" 30"x3        Stairs eccentric stretch   3x30"          Heel walking    8x          Ther Activity             Step-taps              Step-ups    10" step 20x  10" step 20x  10" step  20        Mini squats  2x10 wall squats with GKB 2x10 wall squats with GKB 2x10 wall squats with GKB 2x10 wall squats with GKB 2x10 wall squats with GKB  10x wall squats  10x wall squats with RKB 2x10 wall squats with GKB   Gait Training             Pre-gait hurdles        20x   Yovany with TKE GTB 20x   Yovany with TKE GTB 20x   Yovany with TKE GTB                 Modalities             ice

## 2020-08-03 ENCOUNTER — OFFICE VISIT (OUTPATIENT)
Dept: PHYSICAL THERAPY | Facility: CLINIC | Age: 60
End: 2020-08-03
Payer: COMMERCIAL

## 2020-08-03 DIAGNOSIS — M25.561 RIGHT KNEE PAIN, UNSPECIFIED CHRONICITY: Primary | ICD-10-CM

## 2020-08-03 PROCEDURE — 97110 THERAPEUTIC EXERCISES: CPT | Performed by: PHYSICAL THERAPIST

## 2020-08-03 PROCEDURE — 97530 THERAPEUTIC ACTIVITIES: CPT | Performed by: PHYSICAL THERAPIST

## 2020-08-03 PROCEDURE — 97112 NEUROMUSCULAR REEDUCATION: CPT | Performed by: PHYSICAL THERAPIST

## 2020-08-03 NOTE — PROGRESS NOTES
Daily Note     Today's date: 8/3/2020  Patient name: Ivan Whittington  : 1960  MRN: [id number]  Referring provider: Clarence Mantilla MD  Dx:   Encounter Diagnosis     ICD-10-CM    1  Right knee pain, unspecified chronicity  M25 561        Start Time: 09  Stop Time: 945  Total time in clinic (min): 45 minutes    Subjective:  Patient reports the knee feeling good however continues to have instances of locking  Patient notes that she only has pain when this happens and then after stretching and moving it for a few seconds it unlocks  Objective: See treatment diary below        Assessment:  Patient tolerated treatment well  Patient doing well with progressive strengthening program of the quads  Patient had 1x instance of locking during step-up activities and 1x of giving out  Patient is demonstrating good control and strength despite this happening  Will continue to progress patient as able and monitor symptoms  Patient would benefit from continued PT      Plan: Continue POC          Precautions: N/a      Manuals 7/10 7/20 7/24 7/27 7/31 8/3       Knee PROM   Distraction 4' Distraction 4' Distraction 4' Leg pull  4 mins        Patellar mobs              IASTM on posterior knee 8' 4' 4' 4' 6 mins                     Neuro Re-Ed             [employer] set with strap and towel              Ankle pumps             Heel slides              SLS 3x30" biodex foam 3x30" biodex foam with GTB TKE  3x30" biodex foam with GTB TKE 30"x3  biodex foam with GTB TKE 30"x3  biodex foam with GTB TKE       Wobble board              Lateral walking on foam beam 6x with cone taps    6x with cone taps  6x with cone taps        FWD/BWD on foam beam              Ther Ex             Bike  5 min  5 min  5 min 5 min  NuStep  L3   5 mins   NuStep  L3   5 mins       3-way hip  3x10 ea BTB  HEP            Sidelying abduction              SLR with Quad set              TKE 3x10 15# Sri 3x10 15# Sri 3x10 15# [place] 3x10 15# Sri 15# 3x10  sri 15# 3x10  sri       Leg press 3x10 #15 single leg 3x10 #20 single leg 3x10 #25 single leg 3x10 #25 single leg 3x10 #25  Singe leg 3x10 #30  Singe leg       Hamstring Stretch  2x30"            Bridges             Hamstring curls  Machine 22# 3x10 Machine 22# 3x10 Machine 22# 3x10 Machine 33# 3x10 Machine  33# 3x10 3x10  22# single leg       Heel raises Single leg 30x  Single leg 30x     Single leg 30x        Slider Lunges  2x10 FWD/LAT 2x10 FWD/LAT 2x10 FWD/LAT 2x10 FWD/LAT B/L  2x10  Fwd/lat B/L  2x10  Fwd/lat       Side stepping with TB 4x BTB            Knee Ext machine   3x10   11# single leg  3x10   11# single leg  3x10   11# single leg  3x10  11# single leg 3x10  11# single leg       Stretch Board calf stretch    3x30" 3x30" 30"x3 30"x3       Stairs eccentric stretch   3x30"          Heel walking    8x   6x       Single leg squat       3x10       Ther Activity             Step-taps              Step-ups    10" step 20x  10" step 20x  10" step  20 10" step  20       Mini squats  2x10 wall squats with GKB 2x10 wall squats with GKB 2x10 wall squats with GKB 2x10 wall squats with GKB 2x10 wall squats with GKB 2x10 wall squats with GKB       Gait Training             Pre-gait hurdles                           Modalities             ice

## 2020-08-06 ENCOUNTER — OFFICE VISIT (OUTPATIENT)
Dept: PHYSICAL THERAPY | Facility: CLINIC | Age: 60
End: 2020-08-06
Payer: COMMERCIAL

## 2020-08-06 DIAGNOSIS — M25.561 RIGHT KNEE PAIN, UNSPECIFIED CHRONICITY: Primary | ICD-10-CM

## 2020-08-06 PROCEDURE — 97112 NEUROMUSCULAR REEDUCATION: CPT | Performed by: PHYSICAL THERAPIST

## 2020-08-06 PROCEDURE — 97140 MANUAL THERAPY 1/> REGIONS: CPT | Performed by: PHYSICAL THERAPIST

## 2020-08-06 PROCEDURE — 97110 THERAPEUTIC EXERCISES: CPT | Performed by: PHYSICAL THERAPIST

## 2020-08-06 NOTE — PROGRESS NOTES
Daily Note     Today's date: 2020  Patient name: Marsha Lundborg  : 1960  MRN: [id number]  Referring provider: Blaise Norman MD  Dx:   Encounter Diagnosis     ICD-10-CM    1  Right knee pain, unspecified chronicity  M25 561        Start Time: 1015  Stop Time: 1057  Total time in clinic (min): 42 minutes    Subjective:  Patient reports that the knee is doing a little better and did not have as much locking as she has been which is good  Patient notes that she started using this uv light patch on the back of the knee which has made it feel better as well  Objective: See treatment diary below        Assessment:  Patient tolerated treatment well  Patient had no instances of locking this date  Patient is demonstrating good control and strength  Will continue to progress patient as able and monitor symptoms  Patient would benefit from continued PT      Plan: Continue POC          Precautions: N/a      Manuals 7/10 7/20 7/24 7/27 7/31 8/3 8      Knee PROM   Distraction 4' Distraction 4' Distraction 4' Leg pull  4 mins        Patellar mobs              IASTM on posterior knee 8' 4' 4' 4' 6 mins  10'                   Neuro Re-Ed             [employer] set with strap and towel              Ankle pumps             Heel slides              SLS 3x30" biodex foam 3x30" biodex foam with GTB TKE  3x30" biodex foam with GTB TKE 30"x3  biodex foam with GTB TKE 30"x3  biodex foam with GTB TKE 30"x3  biodex foam with GTB TKE      Wobble board              Lateral walking on foam beam 6x with cone taps    6x with cone taps  6x with cone taps        FWD/BWD on foam beam              Ther Ex             Bike  5 min  5 min  5 min 5 min  5 mins   5 min 5 min       3-way hip  3x10 ea BTB  HEP            Sidelying abduction              SLR with Quad set              TKE 3x10 15# Sri 3x10 15# Sri 3x10 15# [place] 3x10 15# Sri 15# 3x10  sri 15# 3x10  sri 15# 3x10  sri      Leg press 3x10 #15 single leg 3x10 #20 single leg 3x10 #25 single leg 3x10 #25 single leg 3x10 #25  Singe leg 3x10 #30  Singe leg 3x10 #30  Singe leg      Hamstring Stretch  2x30"            Bridges             Hamstring curls  Machine 22# 3x10 Machine 22# 3x10 Machine 22# 3x10 Machine 33# 3x10 Machine  33# 3x10 3x10  22# single leg 3x10  22# single leg      Heel raises Single leg 30x  Single leg 30x     Single leg 30x  Single leg 30x       Slider Lunges  2x10 FWD/LAT 2x10 FWD/LAT 2x10 FWD/LAT 2x10 FWD/LAT B/L  2x10  Fwd/lat B/L  2x10  Fwd/lat B/L  2x10  Fwd/lat      Side stepping with TB 4x BTB            Knee Ext machine   3x10   11# single leg  3x10   11# single leg  3x10   11# single leg  3x10  11# single leg 3x10  11# single leg 3x10  11# single leg      Stretch Board calf stretch    3x30" 3x30" 30"x3 30"x3 30"x3      Stairs eccentric stretch   3x30"          Heel walking    8x   6x       Single leg squat       3x10 3x10 on 6" step       Ther Activity             Step-taps              Step-ups    10" step 20x  10" step 20x  10" step  20 10" step  20 Stairs with no UE support reciprocal   8x      Mini squats  2x10 wall squats with GKB 2x10 wall squats with GKB 2x10 wall squats with GKB 2x10 wall squats with GKB 2x10 wall squats with GKB 2x10 wall squats with GKB 3x10 wall squats with GKB      Gait Training             Pre-gait hurdles                           Modalities             ice

## 2020-08-10 ENCOUNTER — OFFICE VISIT (OUTPATIENT)
Dept: PHYSICAL THERAPY | Facility: CLINIC | Age: 60
End: 2020-08-10
Payer: COMMERCIAL

## 2020-08-10 DIAGNOSIS — M25.561 RIGHT KNEE PAIN, UNSPECIFIED CHRONICITY: Primary | ICD-10-CM

## 2020-08-10 PROCEDURE — 97530 THERAPEUTIC ACTIVITIES: CPT | Performed by: PHYSICAL THERAPIST

## 2020-08-10 PROCEDURE — 97110 THERAPEUTIC EXERCISES: CPT | Performed by: PHYSICAL THERAPIST

## 2020-08-10 PROCEDURE — 97112 NEUROMUSCULAR REEDUCATION: CPT | Performed by: PHYSICAL THERAPIST

## 2020-08-10 NOTE — PROGRESS NOTES
Daily Note     Today's date: 8/10/2020  Patient name: Jadon Sullivan  : 1960  MRN: [id number]  Referring provider: Lauren Mayfield MD  Dx:   Encounter Diagnosis     ICD-10-CM    1  Right knee pain, unspecified chronicity  M25 561        Start Time: 1030  Stop Time: 1110  Total time in clinic (min): 40 minutes    Subjective:  Patient reports that she got an injection at her follow-up last week and was a little sore over the weekend  Patient notes that her doctor recommends discontinuing therapy at this time secondary to progress made thus far  Patient reports that she will be following up with him in 6 weeks secondary to delayed feeling at the fracture sites  Objective: See treatment diary below        Assessment:  Patient tolerated treatment well  Patient demonstrated proper form throughout exercises this date with very minimal need for verbal cueing  Patient is demonstrating good control and strength at the knee with exercises and functional tasks  Patient appropriate for DC next visit  Patient would benefit from continued PT      Plan: DC next visit          Precautions: N/a      Manuals 7/10 7/20 7/24 7/27 7/31 8/3 8/6 8/10     Knee PROM   Distraction 4' Distraction 4' Distraction 4' Leg pull  4 mins        Patellar mobs              IASTM on posterior knee 8' 4' 4' 4' 6 mins  10'                   Neuro Re-Ed             [employer] set with strap and towel              Ankle pumps             Heel slides              SLS 3x30" biodex foam 3x30" biodex foam with GTB TKE  3x30" biodex foam with GTB TKE 30"x3  biodex foam with GTB TKE 30"x3  biodex foam with GTB TKE 30"x3  biodex foam with GTB TKE 30"x3  biodex foam with GTB TKE     Wobble board              Lateral walking on foam beam 6x with cone taps    6x with cone taps  6x with cone taps   6x with cone taps  LAT/FWD     FWD/BWD on foam beam              Ther Ex             Bike  5 min  5 min  5 min 5 min  5 mins   5 min 5 min  5 min     3-way hip  3x10 ea BTB  HEP            Sidelying abduction              SLR with Quad set              TKE 3x10 15# [place] 3x10 15# Sri 3x10 15# Sri 3x10 15# Helix 15# 3x10  sri 15# 3x10  sri 15# 3x10  sri 15# 3x10  sri     Leg press 3x10 #15 single leg 3x10 #20 single leg 3x10 #25 single leg 3x10 #25 single leg 3x10 #25  Singe leg 3x10 #30  Singe leg 3x10 #30  Singe leg 3x10 #30  Singe leg     Hamstring Stretch  2x30"            Bridges             Hamstring curls  Machine 22# 3x10 Machine 22# 3x10 Machine 22# 3x10 Machine 33# 3x10 Machine  33# 3x10 3x10  22# single leg 3x10  22# single leg 3x10  22# single leg     Heel raises Single leg 30x  Single leg 30x     Single leg 30x  Single leg 30x  Single leg 30x      Slider Lunges  2x10 FWD/LAT 2x10 FWD/LAT 2x10 FWD/LAT 2x10 FWD/LAT B/L  2x10  Fwd/lat B/L  2x10  Fwd/lat B/L  2x10  Fwd/lat B/L  2x10  Fwd/lat     Side stepping with TB 4x BTB       4x BTB     Knee Ext machine   3x10   11# single leg  3x10   11# single leg  3x10   11# single leg  3x10  11# single leg 3x10  11# single leg 3x10  11# single leg 3x10  11# single leg     Stretch Board calf stretch    3x30" 3x30" 30"x3 30"x3 30"x3 30"x3     Stairs eccentric stretch   3x30"          Heel walking    8x   6x       Single leg squat       3x10 3x10 on 6" step  3x10 on 6" step      Ther Activity             Step-taps              Step-ups    10" step 20x  10" step 20x  10" step  20 10" step  20 Stairs with no UE support reciprocal   8x      Mini squats  2x10 wall squats with GKB 2x10 wall squats with GKB 2x10 wall squats with GKB 2x10 wall squats with GKB 2x10 wall squats with GKB 2x10 wall squats with GKB 3x10 wall squats with GKB 3x10 wall squats with GKB     Single leg heel raise         30x     Gait Training             Pre-gait hurdles                           Modalities             ice

## 2020-08-13 ENCOUNTER — OFFICE VISIT (OUTPATIENT)
Dept: PHYSICAL THERAPY | Facility: CLINIC | Age: 60
End: 2020-08-13
Payer: COMMERCIAL

## 2020-08-13 DIAGNOSIS — M25.561 RIGHT KNEE PAIN, UNSPECIFIED CHRONICITY: Primary | ICD-10-CM

## 2020-08-13 PROCEDURE — 97110 THERAPEUTIC EXERCISES: CPT | Performed by: PHYSICAL THERAPIST

## 2020-08-13 PROCEDURE — 97530 THERAPEUTIC ACTIVITIES: CPT | Performed by: PHYSICAL THERAPIST

## 2020-08-13 NOTE — PROGRESS NOTES
PT Discharge    Today's date: 2020  Patient name: Jackie Ferrer  : 1960  MRN: [id number]  Referring provider: Harpal Lau MD  Dx:   Encounter Diagnosis     ICD-10-CM    1  Right knee pain, unspecified chronicity  [id number] [id number]        Start Time: 161  Stop Time: 1700  Total time in clinic (min): 45 minutes    Subjective:  Patient notes that she feels she is 90% improved since the start of therapy  Patient notes that she has improved with her walking mechanics, muscle strength, balance with walking on uneven, and stairs  Patient notes the only complaint she continues to have is "locking" after she is sitting or laying down for long periods of time  Patient notes on a good day it happens 1x and a bad day 2-3x  Patient notes that she has pain with this when she tries to straighten it  Pain   Current pain ratin  At best pain ratin  At worst pain ratin/10       Objective: See treatment diary below  Active Range of Motion   Left Knee   Normal active range of motion     Right Knee   Flexion: 125 degrees   Extensor la degrees      Passive Range of Motion      Right Knee   Flexion: 130 degrees   Extension: 0 degrees      Strength/Myotome Testing      Right Hip   Planes of Motion   Flexion: 5  Extension: 4+  Abduction: 4+     Isolated Muscles   Gluteus maximums: 4+  Gluteus medius: 4+     Left Knee   Flexion: 5  Extension: 5     Right Knee   Normal strength  Flexion: 5  Extension: 5  Quadriceps contraction: good     Left Ankle/Foot   Dorsiflexion: 4+  Plantar flexion: 4+     Right Ankle/Foot   Dorsiflexion: 4+  Plantar flexion: 4+     Gait observation: normal gait; symmetrical to contralateral side  Stairs: reciprocal no UE support 6" steps        Assessment:  Patient has completed  visits thus far in this episode of care for s/p post-op femur stress fracture repair, medial meniscus partial meniscectomy, arthroscopic chrondroplasty on 2020   Patient notes that she feels she is 90% improved since the start of therapy  Patient notes that she has improved with her walking mechanics, muscle strength, balance with walking on uneven, and stairs  Patient notes the only complaint she continues to have is "locking" after she is sitting or laying down for long periods of time  Patient notes on a good day it happens 1x and a bad day 2-3x  Patient notes that she has pain with this when she tries to straighten it  Upon evaluation, patient demonstrated good improvements with overall pain, knee ROM, LE strength, ambulation  Patient scored a 83 on FOTO indicating improvements with overall function  Patient has met all of her short term goals and long term goals  Patient is competent in comprehensive HEP and had no further questions  Patient appropriate for DC this date  Goals  Short Term Goals: to be achieved by 4 weeks  1) Patient to be independent with basic HEP  MET  2) Decrease pain to 4/10 at its worst  MET   3) Increase knee ROM by 5-10 degrees  MET  4) Demonstrated good quad control and normal knee flexion/extension ROM  MET    Long Term Goals: to be achieved by discharge  1) FOTO equal to or greater than target score indicating improvements with overall function  MET  2) Ambulation to improve to maximal level of function  MET   3) Stair negotiation will improve to reciprocal   MET   4) Patient to be independent in comprehensive HEP   MET    Plan: Patient appropriate for DC this date            Precautions: N/a      Manuals 7/10 7/20 7/24 7/27 7/31 8/3 8/6 8/10 8/13    Knee PROM   Distraction 4' Distraction 4' Distraction 4' Leg pull  4 mins        Patellar mobs              IASTM on posterior knee 8' 4' 4' 4' 6 mins  10'                   Neuro Re-Ed             [employer] set with strap and towel              Ankle pumps             Heel slides              SLS 3x30" biodex foam 3x30" biodex foam with GTB TKE  3x30" biodex foam with GTB TKE 30"x3  biodex foam with GTB TKE 30"x3  biodex foam with GTB TKE 30"x3  biodex foam with GTB TKE 30"x3  biodex foam with GTB TKE     Wobble board              Lateral walking on foam beam 6x with cone taps    6x with cone taps  6x with cone taps   6x with cone taps  LAT/FWD     FWD/BWD on foam beam              Ther Ex             Bike  5 min  5 min  5 min 5 min  5 mins   5 min 5 min  5 min 5 min     3-way hip  3x10 ea BTB  HEP            Sidelying abduction              SLR with Quad set              TKE 3x10 15# Sri 3x10 15# [place] 3x10 15# Sri 3x10 15# Sri 15# 3x10  sri 15# 3x10  sri 15# 3x10  sri 15# 3x10  sri 15# 3x10  sri    Leg press 3x10 #15 single leg 3x10 #20 single leg 3x10 #25 single leg 3x10 #25 single leg 3x10 #25  Singe leg 3x10 #30  Singe leg 3x10 #30  Singe leg 3x10 #30  Singe leg 3x10 #30  Singe leg    Hamstring Stretch  2x30"            Bridges             Hamstring curls  Machine 22# 3x10 Machine 22# 3x10 Machine 22# 3x10 Machine 33# 3x10 Machine  33# 3x10 3x10  22# single leg 3x10  22# single leg 3x10  22# single leg 3x10  22# single leg    Heel raises Single leg 30x  Single leg 30x     Single leg 30x  Single leg 30x  Single leg 30x  Single leg 30x     Slider Lunges  2x10 FWD/LAT 2x10 FWD/LAT 2x10 FWD/LAT 2x10 FWD/LAT B/L  2x10  Fwd/lat B/L  2x10  Fwd/lat B/L  2x10  Fwd/lat B/L  2x10  Fwd/lat B/L  2x10  Fwd/lat    Side stepping with TB 4x BTB       4x BTB 4x BTB    Knee Ext machine   3x10   11# single leg  3x10   11# single leg  3x10   11# single leg  3x10  11# single leg 3x10  11# single leg 3x10  11# single leg 3x10  11# single leg 3x10  22# single leg    Stretch Board calf stretch    3x30" 3x30" 30"x3 30"x3 30"x3 30"x3     Stairs eccentric stretch   3x30"          Heel walking    8x   6x       Single leg squat       3x10 3x10 on 6" step  3x10 on 6" step  3x10 on 6" step     Ther Activity             Step-taps              Step-ups    10" step 20x  10" step 20x  10" step  20 10" step  20 Stairs with no UE support reciprocal   8x      Mini squats  2x10 wall squats with GKB 2x10 wall squats with GKB 2x10 wall squats with GKB 2x10 wall squats with GKB 2x10 wall squats with GKB 2x10 wall squats with GKB 3x10 wall squats with GKB 3x10 wall squats with GKB 3x10 wall squats with GKB    Gait Training             Pre-gait hurdles                           Modalities             ice

## 2021-02-16 ENCOUNTER — OFFICE VISIT (OUTPATIENT)
Dept: OBGYN CLINIC | Facility: CLINIC | Age: 61
End: 2021-02-16
Payer: COMMERCIAL

## 2021-02-16 VITALS — DIASTOLIC BLOOD PRESSURE: 80 MMHG | SYSTOLIC BLOOD PRESSURE: 122 MMHG | BODY MASS INDEX: 28.49 KG/M2 | WEIGHT: 166 LBS

## 2021-02-16 DIAGNOSIS — Z78.0 POSTMENOPAUSAL: ICD-10-CM

## 2021-02-16 DIAGNOSIS — Z12.31 ENCOUNTER FOR SCREENING MAMMOGRAM FOR BREAST CANCER: ICD-10-CM

## 2021-02-16 DIAGNOSIS — Z12.39 ENCOUNTER FOR SCREENING BREAST EXAMINATION: ICD-10-CM

## 2021-02-16 DIAGNOSIS — Z01.419 ENCOUNTER FOR WELL WOMAN EXAM: Primary | ICD-10-CM

## 2021-02-16 PROCEDURE — S0610 ANNUAL GYNECOLOGICAL EXAMINA: HCPCS | Performed by: PHYSICIAN ASSISTANT

## 2021-02-16 RX ORDER — LEVOTHYROXINE SODIUM 0.1 MG/1
100 TABLET ORAL DAILY
COMMUNITY

## 2021-02-16 RX ORDER — OMEPRAZOLE 20 MG/1
20 CAPSULE, DELAYED RELEASE ORAL DAILY
COMMUNITY

## 2021-02-16 RX ORDER — DIPHENOXYLATE HYDROCHLORIDE AND ATROPINE SULFATE 2.5; .025 MG/1; MG/1
1 TABLET ORAL DAILY
COMMUNITY

## 2021-02-16 NOTE — PROGRESS NOTES
Assessment/Plan:    No problem-specific Assessment & Plan notes found for this encounter  Diagnoses and all orders for this visit:    Encounter for well woman exam    Encounter for screening breast examination    Encounter for screening mammogram for breast cancer  -     Mammo screening bilateral w 3d & cad; Future    Postmenopausal    Other orders  -     Rhubarb (ESTROVEN COMPLETE PO); Take by mouth  -     levothyroxine 100 mcg tablet; Take 100 mcg by mouth daily  -     multivitamin (THERAGRAN) TABS; Take 1 tablet by mouth daily  -     omeprazole (PriLOSEC) 20 mg delayed release capsule; Take 20 mg by mouth daily          Subjective:      Patient ID: Krystal Greco is a 59 y o  female  Pt presents as new pt for her annual exam today--  She has no complaints  No changes  Feeling well  She has no bleeding or pelvic pain--postmeno  Bowel and bladder are regular  Colonoscopy--2017  No breast concerns today  Last mammo--1/2020      No pap today  rx mammo  Daily mvi      The following portions of the patient's history were reviewed and updated as appropriate: allergies, current medications, past family history, past medical history, past social history, past surgical history and problem list     Review of Systems   Constitutional: Negative for chills, fever and unexpected weight change  Gastrointestinal: Negative for abdominal pain, blood in stool, constipation and diarrhea  Genitourinary: Negative  Objective:      /80   Wt 75 3 kg (166 lb)   Breastfeeding No   BMI 28 49 kg/m²          Physical Exam  Vitals signs and nursing note reviewed  Constitutional:       Appearance: She is well-developed  HENT:      Head: Normocephalic and atraumatic  Neck:      Musculoskeletal: Normal range of motion  Chest:      Breasts:         Right: No inverted nipple, mass, nipple discharge or skin change  Left: No inverted nipple, mass, nipple discharge or skin change     Abdominal: Palpations: Abdomen is soft  Genitourinary:     Exam position: Supine  Labia:         Right: No rash, tenderness or lesion  Left: No rash, tenderness or lesion  Vagina: Normal       Cervix: No cervical motion tenderness, discharge or friability  Adnexa:         Right: No mass, tenderness or fullness  Left: No mass, tenderness or fullness  Lymphadenopathy:      Lower Body: No right inguinal adenopathy  No left inguinal adenopathy

## 2021-02-16 NOTE — PROGRESS NOTES
Patient is here for yearly exam   Patient has no bleeding  Patient has had some LLQ pain, patient thinks it is from eating some gluten  Patient has no breast concerns and B&B ok  Patient is not due for a pap smear  2/11/20 Normal Pap   1/30/20 Normal Mammo

## 2021-02-24 ENCOUNTER — TRANSCRIBE ORDERS (OUTPATIENT)
Dept: ADMINISTRATIVE | Facility: HOSPITAL | Age: 61
End: 2021-02-24

## 2021-02-24 ENCOUNTER — LAB (OUTPATIENT)
Dept: LAB | Facility: MEDICAL CENTER | Age: 61
End: 2021-02-24
Payer: COMMERCIAL

## 2021-02-24 DIAGNOSIS — D58.2 ELEVATED HEMOGLOBIN (HCC): ICD-10-CM

## 2021-02-24 DIAGNOSIS — Z13.220 SCREENING, LIPID: ICD-10-CM

## 2021-02-24 DIAGNOSIS — R73.09 ABNORMAL GLUCOSE: ICD-10-CM

## 2021-02-24 DIAGNOSIS — N18.2 CKD (CHRONIC KIDNEY DISEASE), STAGE II: Primary | ICD-10-CM

## 2021-02-24 LAB
ALBUMIN SERPL BCP-MCNC: 4.1 G/DL (ref 3.5–5)
ALP SERPL-CCNC: 90 U/L (ref 46–116)
ALT SERPL W P-5'-P-CCNC: 45 U/L (ref 12–78)
ANION GAP SERPL CALCULATED.3IONS-SCNC: 2 MMOL/L (ref 4–13)
AST SERPL W P-5'-P-CCNC: 34 U/L (ref 5–45)
BASOPHILS # BLD AUTO: 0.05 THOUSANDS/ΜL (ref 0–0.1)
BASOPHILS NFR BLD AUTO: 1 % (ref 0–1)
BILIRUB SERPL-MCNC: 0.45 MG/DL (ref 0.2–1)
BUN SERPL-MCNC: 20 MG/DL (ref 5–25)
CALCIUM SERPL-MCNC: 9.4 MG/DL (ref 8.3–10.1)
CHLORIDE SERPL-SCNC: 108 MMOL/L (ref 100–108)
CHOLEST SERPL-MCNC: 226 MG/DL (ref 50–200)
CO2 SERPL-SCNC: 29 MMOL/L (ref 21–32)
CREAT SERPL-MCNC: 0.97 MG/DL (ref 0.6–1.3)
EOSINOPHIL # BLD AUTO: 0.12 THOUSAND/ΜL (ref 0–0.61)
EOSINOPHIL NFR BLD AUTO: 2 % (ref 0–6)
ERYTHROCYTE [DISTWIDTH] IN BLOOD BY AUTOMATED COUNT: 12.6 % (ref 11.6–15.1)
EST. AVERAGE GLUCOSE BLD GHB EST-MCNC: 105 MG/DL
GFR SERPL CREATININE-BSD FRML MDRD: 62 ML/MIN/1.73SQ M
GLUCOSE P FAST SERPL-MCNC: 97 MG/DL (ref 65–99)
HBA1C MFR BLD: 5.3 %
HCT VFR BLD AUTO: 47.5 % (ref 34.8–46.1)
HDLC SERPL-MCNC: 54 MG/DL
HGB BLD-MCNC: 15.3 G/DL (ref 11.5–15.4)
IMM GRANULOCYTES # BLD AUTO: 0.02 THOUSAND/UL (ref 0–0.2)
IMM GRANULOCYTES NFR BLD AUTO: 0 % (ref 0–2)
LDLC SERPL CALC-MCNC: 153 MG/DL (ref 0–100)
LYMPHOCYTES # BLD AUTO: 1.46 THOUSANDS/ΜL (ref 0.6–4.47)
LYMPHOCYTES NFR BLD AUTO: 25 % (ref 14–44)
MCH RBC QN AUTO: 29.7 PG (ref 26.8–34.3)
MCHC RBC AUTO-ENTMCNC: 32.2 G/DL (ref 31.4–37.4)
MCV RBC AUTO: 92 FL (ref 82–98)
MONOCYTES # BLD AUTO: 0.66 THOUSAND/ΜL (ref 0.17–1.22)
MONOCYTES NFR BLD AUTO: 11 % (ref 4–12)
NEUTROPHILS # BLD AUTO: 3.66 THOUSANDS/ΜL (ref 1.85–7.62)
NEUTS SEG NFR BLD AUTO: 61 % (ref 43–75)
NONHDLC SERPL-MCNC: 172 MG/DL
NRBC BLD AUTO-RTO: 0 /100 WBCS
PLATELET # BLD AUTO: 218 THOUSANDS/UL (ref 149–390)
PMV BLD AUTO: 10.4 FL (ref 8.9–12.7)
POTASSIUM SERPL-SCNC: 4.7 MMOL/L (ref 3.5–5.3)
PROT SERPL-MCNC: 7.7 G/DL (ref 6.4–8.2)
RBC # BLD AUTO: 5.16 MILLION/UL (ref 3.81–5.12)
SODIUM SERPL-SCNC: 139 MMOL/L (ref 136–145)
TRIGL SERPL-MCNC: 93 MG/DL
WBC # BLD AUTO: 5.97 THOUSAND/UL (ref 4.31–10.16)

## 2021-02-24 PROCEDURE — 36415 COLL VENOUS BLD VENIPUNCTURE: CPT | Performed by: INTERNAL MEDICINE

## 2021-02-24 PROCEDURE — 83036 HEMOGLOBIN GLYCOSYLATED A1C: CPT | Performed by: INTERNAL MEDICINE

## 2021-02-24 PROCEDURE — 80053 COMPREHEN METABOLIC PANEL: CPT | Performed by: INTERNAL MEDICINE

## 2021-02-24 PROCEDURE — 80061 LIPID PANEL: CPT | Performed by: INTERNAL MEDICINE

## 2021-02-24 PROCEDURE — 85025 COMPLETE CBC W/AUTO DIFF WBC: CPT | Performed by: INTERNAL MEDICINE

## 2021-05-25 ENCOUNTER — HOSPITAL ENCOUNTER (OUTPATIENT)
Dept: RADIOLOGY | Age: 61
Discharge: HOME | End: 2021-05-25
Payer: COMMERCIAL

## 2021-05-25 VITALS — HEIGHT: 64 IN | BODY MASS INDEX: 27.83 KG/M2 | WEIGHT: 163 LBS

## 2021-05-25 DIAGNOSIS — Z12.31 ENCOUNTER FOR SCREENING MAMMOGRAM FOR BREAST CANCER: ICD-10-CM

## 2021-05-25 PROCEDURE — 77067 SCR MAMMO BI INCL CAD: CPT

## 2021-05-25 PROCEDURE — 77063 BREAST TOMOSYNTHESIS BI: CPT

## 2022-02-17 ENCOUNTER — ANNUAL EXAM (OUTPATIENT)
Dept: OBGYN CLINIC | Facility: CLINIC | Age: 62
End: 2022-02-17
Payer: COMMERCIAL

## 2022-02-17 VITALS
DIASTOLIC BLOOD PRESSURE: 80 MMHG | BODY MASS INDEX: 31.18 KG/M2 | WEIGHT: 176 LBS | SYSTOLIC BLOOD PRESSURE: 120 MMHG | HEIGHT: 63 IN

## 2022-02-17 DIAGNOSIS — Z12.39 ENCOUNTER FOR SCREENING BREAST EXAMINATION: ICD-10-CM

## 2022-02-17 DIAGNOSIS — Z12.31 ENCOUNTER FOR SCREENING MAMMOGRAM FOR BREAST CANCER: ICD-10-CM

## 2022-02-17 DIAGNOSIS — Z78.0 POSTMENOPAUSAL: ICD-10-CM

## 2022-02-17 DIAGNOSIS — Z01.419 ENCOUNTER FOR WELL WOMAN EXAM: Primary | ICD-10-CM

## 2022-02-17 PROCEDURE — S0612 ANNUAL GYNECOLOGICAL EXAMINA: HCPCS | Performed by: PHYSICIAN ASSISTANT

## 2022-02-17 NOTE — PROGRESS NOTES
The patient is here for a yearly  2/11/20 Normal Pap  No bleeding or cramping  No vaginal, bowel, bladder or breast problems

## 2022-02-17 NOTE — PROGRESS NOTES
Assessment/Plan:    No problem-specific Assessment & Plan notes found for this encounter  Diagnoses and all orders for this visit:    Encounter for well woman exam    Encounter for screening breast examination    Encounter for screening mammogram for breast cancer  -     Mammo screening bilateral w 3d & cad; Future    Postmenopausal          Subjective:      Patient ID: Fady Pandey is a 57 y o  female  Pt presents for her annual exam today--  She has no complaints  She has no bleeding or pelvic pain  Bowel and bladder are regular  Colonoscopy--2017  No breast concerns today  Last mammo--2021      No pap today  rx mammo  Daily ca, d      The following portions of the patient's history were reviewed and updated as appropriate: allergies, current medications, past family history, past medical history, past social history, past surgical history and problem list     Review of Systems   Constitutional: Negative for chills, fever and unexpected weight change  Gastrointestinal: Negative for abdominal pain, blood in stool, constipation and diarrhea  Genitourinary: Negative  Objective:      /80   Ht 5' 3" (1 6 m)   Wt 79 8 kg (176 lb)   BMI 31 18 kg/m²          Physical Exam  Vitals and nursing note reviewed  Constitutional:       Appearance: She is well-developed  HENT:      Head: Normocephalic and atraumatic  Chest:   Breasts:      Right: No inverted nipple, mass, nipple discharge or skin change  Left: No inverted nipple, mass, nipple discharge or skin change  Abdominal:      Palpations: Abdomen is soft  Genitourinary:     Exam position: Supine  Labia:         Right: No rash, tenderness or lesion  Left: No rash, tenderness or lesion  Vagina: Normal       Cervix: No cervical motion tenderness, discharge or friability  Adnexa:         Right: No mass, tenderness or fullness  Left: No mass, tenderness or fullness       Musculoskeletal: Cervical back: Normal range of motion  Lymphadenopathy:      Lower Body: No right inguinal adenopathy  No left inguinal adenopathy

## 2022-05-24 ENCOUNTER — APPOINTMENT (OUTPATIENT)
Dept: LAB | Facility: MEDICAL CENTER | Age: 62
End: 2022-05-24
Payer: COMMERCIAL

## 2022-05-24 DIAGNOSIS — N18.2 CKD (CHRONIC KIDNEY DISEASE), STAGE II: ICD-10-CM

## 2022-05-24 DIAGNOSIS — Z13.0 SCREENING FOR IRON DEFICIENCY ANEMIA: ICD-10-CM

## 2022-05-24 DIAGNOSIS — E78.2 MIXED HYPERLIPIDEMIA: ICD-10-CM

## 2022-05-24 LAB
ALBUMIN SERPL BCP-MCNC: 3.8 G/DL (ref 3.5–5)
ALP SERPL-CCNC: 96 U/L (ref 46–116)
ALT SERPL W P-5'-P-CCNC: 27 U/L (ref 12–78)
ANION GAP SERPL CALCULATED.3IONS-SCNC: 2 MMOL/L (ref 4–13)
AST SERPL W P-5'-P-CCNC: 21 U/L (ref 5–45)
BASOPHILS # BLD AUTO: 0.05 THOUSANDS/ΜL (ref 0–0.1)
BASOPHILS NFR BLD AUTO: 1 % (ref 0–1)
BILIRUB SERPL-MCNC: 0.73 MG/DL (ref 0.2–1)
BUN SERPL-MCNC: 17 MG/DL (ref 5–25)
CALCIUM SERPL-MCNC: 9.9 MG/DL (ref 8.3–10.1)
CHLORIDE SERPL-SCNC: 108 MMOL/L (ref 100–108)
CHOLEST SERPL-MCNC: 242 MG/DL
CO2 SERPL-SCNC: 30 MMOL/L (ref 21–32)
CREAT SERPL-MCNC: 1 MG/DL (ref 0.6–1.3)
EOSINOPHIL # BLD AUTO: 0.12 THOUSAND/ΜL (ref 0–0.61)
EOSINOPHIL NFR BLD AUTO: 2 % (ref 0–6)
ERYTHROCYTE [DISTWIDTH] IN BLOOD BY AUTOMATED COUNT: 12.9 % (ref 11.6–15.1)
GFR SERPL CREATININE-BSD FRML MDRD: 59 ML/MIN/1.73SQ M
GLUCOSE P FAST SERPL-MCNC: 94 MG/DL (ref 65–99)
HCT VFR BLD AUTO: 45.8 % (ref 34.8–46.1)
HDLC SERPL-MCNC: 56 MG/DL
HGB BLD-MCNC: 15.1 G/DL (ref 11.5–15.4)
IMM GRANULOCYTES # BLD AUTO: 0.02 THOUSAND/UL (ref 0–0.2)
IMM GRANULOCYTES NFR BLD AUTO: 0 % (ref 0–2)
LDLC SERPL CALC-MCNC: 167 MG/DL (ref 0–100)
LYMPHOCYTES # BLD AUTO: 1.83 THOUSANDS/ΜL (ref 0.6–4.47)
LYMPHOCYTES NFR BLD AUTO: 28 % (ref 14–44)
MCH RBC QN AUTO: 30.8 PG (ref 26.8–34.3)
MCHC RBC AUTO-ENTMCNC: 33 G/DL (ref 31.4–37.4)
MCV RBC AUTO: 94 FL (ref 82–98)
MONOCYTES # BLD AUTO: 0.7 THOUSAND/ΜL (ref 0.17–1.22)
MONOCYTES NFR BLD AUTO: 11 % (ref 4–12)
NEUTROPHILS # BLD AUTO: 3.84 THOUSANDS/ΜL (ref 1.85–7.62)
NEUTS SEG NFR BLD AUTO: 58 % (ref 43–75)
NONHDLC SERPL-MCNC: 186 MG/DL
NRBC BLD AUTO-RTO: 0 /100 WBCS
PLATELET # BLD AUTO: 217 THOUSANDS/UL (ref 149–390)
PMV BLD AUTO: 10.4 FL (ref 8.9–12.7)
POTASSIUM SERPL-SCNC: 4.2 MMOL/L (ref 3.5–5.3)
PROT SERPL-MCNC: 7.6 G/DL (ref 6.4–8.2)
RBC # BLD AUTO: 4.9 MILLION/UL (ref 3.81–5.12)
SODIUM SERPL-SCNC: 140 MMOL/L (ref 136–145)
TRIGL SERPL-MCNC: 95 MG/DL
WBC # BLD AUTO: 6.56 THOUSAND/UL (ref 4.31–10.16)

## 2022-05-24 PROCEDURE — 80053 COMPREHEN METABOLIC PANEL: CPT

## 2022-05-24 PROCEDURE — 36415 COLL VENOUS BLD VENIPUNCTURE: CPT

## 2022-05-24 PROCEDURE — 80061 LIPID PANEL: CPT

## 2022-05-24 PROCEDURE — 85025 COMPLETE CBC W/AUTO DIFF WBC: CPT

## 2022-05-31 ENCOUNTER — HOSPITAL ENCOUNTER (OUTPATIENT)
Dept: RADIOLOGY | Age: 62
Discharge: HOME | End: 2022-05-31
Payer: COMMERCIAL

## 2022-05-31 VITALS — WEIGHT: 170 LBS | HEIGHT: 63 IN | BODY MASS INDEX: 30.12 KG/M2

## 2022-05-31 DIAGNOSIS — Z12.31 ENCOUNTER FOR SCREENING MAMMOGRAM FOR BREAST CANCER: ICD-10-CM

## 2022-05-31 PROCEDURE — 77063 BREAST TOMOSYNTHESIS BI: CPT

## 2022-05-31 PROCEDURE — 77067 SCR MAMMO BI INCL CAD: CPT

## 2023-02-21 ENCOUNTER — HOSPITAL ENCOUNTER (OUTPATIENT)
Dept: RADIOLOGY | Facility: HOSPITAL | Age: 63
Discharge: HOME | End: 2023-02-21
Attending: ORTHOPAEDIC SURGERY

## 2023-02-21 ENCOUNTER — OFFICE VISIT (OUTPATIENT)
Dept: OBGYN CLINIC | Facility: HOSPITAL | Age: 63
End: 2023-02-21

## 2023-02-21 VITALS
SYSTOLIC BLOOD PRESSURE: 152 MMHG | HEART RATE: 90 BPM | HEIGHT: 63 IN | BODY MASS INDEX: 30.11 KG/M2 | DIASTOLIC BLOOD PRESSURE: 84 MMHG

## 2023-02-21 DIAGNOSIS — M25.561 RIGHT KNEE PAIN, UNSPECIFIED CHRONICITY: Primary | ICD-10-CM

## 2023-02-21 DIAGNOSIS — M25.561 RIGHT KNEE PAIN, UNSPECIFIED CHRONICITY: ICD-10-CM

## 2023-02-21 DIAGNOSIS — M17.11 PRIMARY OSTEOARTHRITIS OF RIGHT KNEE: ICD-10-CM

## 2023-02-21 DIAGNOSIS — Z48.89 ENCOUNTER FOR OTHER SPECIFIED SURGICAL AFTERCARE: ICD-10-CM

## 2023-02-21 DIAGNOSIS — Z47.89 ENCOUNTER FOR OTHER ORTHOPEDIC AFTERCARE: ICD-10-CM

## 2023-02-21 RX ORDER — OMEPRAZOLE 40 MG/1
40 CAPSULE, DELAYED RELEASE ORAL DAILY
COMMUNITY
Start: 2023-02-03

## 2023-02-21 RX ORDER — FOLIC ACID 1 MG/1
1 TABLET ORAL DAILY
Qty: 30 TABLET | Refills: 0 | Status: SHIPPED | OUTPATIENT
Start: 2023-02-21

## 2023-02-21 RX ORDER — ASCORBIC ACID 500 MG
500 TABLET ORAL DAILY
Qty: 30 TABLET | Refills: 0 | Status: SHIPPED | OUTPATIENT
Start: 2023-02-21

## 2023-02-21 RX ORDER — GABAPENTIN 300 MG/1
300 CAPSULE ORAL ONCE
OUTPATIENT
Start: 2023-02-21 | End: 2023-02-21

## 2023-02-21 RX ORDER — ACETAMINOPHEN 325 MG/1
975 TABLET ORAL ONCE
OUTPATIENT
Start: 2023-02-21 | End: 2023-02-21

## 2023-02-21 RX ORDER — FERROUS SULFATE TAB EC 324 MG (65 MG FE EQUIVALENT) 324 (65 FE) MG
324 TABLET DELAYED RESPONSE ORAL
Qty: 60 TABLET | Refills: 0 | Status: SHIPPED | OUTPATIENT
Start: 2023-02-21

## 2023-02-21 RX ORDER — CHLORHEXIDINE GLUCONATE 0.12 MG/ML
15 RINSE ORAL ONCE
OUTPATIENT
Start: 2023-02-21 | End: 2023-02-21

## 2023-02-21 RX ORDER — ENOXAPARIN SODIUM 100 MG/ML
40 INJECTION SUBCUTANEOUS DAILY
Qty: 11.2 ML | Refills: 0 | Status: SHIPPED | OUTPATIENT
Start: 2023-02-21 | End: 2023-03-21

## 2023-02-21 RX ORDER — CEFAZOLIN SODIUM 2 G/50ML
2000 SOLUTION INTRAVENOUS ONCE
OUTPATIENT
Start: 2023-02-21 | End: 2023-02-21

## 2023-02-21 RX ORDER — VITAMIN B COMPLEX
TABLET ORAL
COMMUNITY
Start: 2022-11-01

## 2023-02-21 RX ORDER — SODIUM CHLORIDE, SODIUM LACTATE, POTASSIUM CHLORIDE, CALCIUM CHLORIDE 600; 310; 30; 20 MG/100ML; MG/100ML; MG/100ML; MG/100ML
125 INJECTION, SOLUTION INTRAVENOUS CONTINUOUS
OUTPATIENT
Start: 2023-02-21

## 2023-02-21 RX ORDER — LEVOTHYROXINE SODIUM 0.07 MG/1
75 TABLET ORAL DAILY
COMMUNITY
Start: 2023-01-11

## 2023-02-21 NOTE — PROGRESS NOTES
Assessment:  1  Right knee pain, unspecified chronicity  XR knee 3 vw right non injury      2  Primary osteoarthritis of right knee        3  Encounter for other specified surgical aftercare        4  Encounter for other orthopedic aftercare            Plan:  The patient will be scheduled for right total knee arthroplasty  We feel the patient will find significant relief per current symptoms, findings on imaging and exam   Risks, benefits, precautions and expectations were discussed  Risks include blood loss, potential infection and blood clots  Preoperative vitamins were prescribed  The patient should follow up after surgery  To do next visit:  Return for post-op with x-rays  The above stated was discussed in layman's terms and the patient expressed understanding  All questions were answered to the patient's satisfaction  Scribe Attestation    I,:  Norma Reddy am acting as a scribe while in the presence of the attending physician :       I,:  Sharan Green MD personally performed the services described in this documentation    as scribed in my presence :             Subjective:   Claudette Gong is a 70 y o  female who presents for initial evaluation of right knee with history of 2x surgery over past 5 years with Joe Wong  She has longstanding history of right knee issues  Today she complains of right generalized knee pain, instability and locking  Walking, transitional positions aggravate while rest alleviates  She does use Advil occasionally  She does use compression sleeve with some benefit  She has had multiple steroid and visco injections with limited benefit  She has done physical therapy with limited benefit  Again, she has had arthroscopy and subchondroplasty with Dr Miller over past 5 years  She has history of left TKA 10 years ago, Dr Miller  Review of systems negative unless otherwise specified in HPI    History reviewed   No pertinent past medical history  Past Surgical History:   Procedure Laterality Date   • COLONOSCOPY  2017    normal       Family History   Problem Relation Age of Onset   • Prostate cancer Father         62s or 76s   • Breast cancer Maternal Grandmother 48   • Breast cancer Paternal Grandmother 61        in her 62s   • No Known Problems Mother    • Lymphoma Sister    • No Known Problems Daughter    • No Known Problems Maternal Grandfather    • No Known Problems Paternal Grandfather    • No Known Problems Sister    • No Known Problems Sister    • No Known Problems Paternal Aunt        Social History     Occupational History   • Not on file   Tobacco Use   • Smoking status: Never   • Smokeless tobacco: Never   Vaping Use   • Vaping Use: Never used   Substance and Sexual Activity   • Alcohol use:  Yes   • Drug use: Never   • Sexual activity: Yes     Partners: Male         Current Outpatient Medications:   •  cholecalciferol (VITAMIN D3) 25 mcg (1,000 units) tablet, , Disp: , Rfl:   •  levothyroxine 100 mcg tablet, Take 100 mcg by mouth daily, Disp: , Rfl:   •  levothyroxine 75 mcg tablet, Take 75 mcg by mouth daily, Disp: , Rfl:   •  multivitamin (THERAGRAN) TABS, Take 1 tablet by mouth daily, Disp: , Rfl:   •  omeprazole (PriLOSEC) 20 mg delayed release capsule, Take 20 mg by mouth daily, Disp: , Rfl:   •  omeprazole (PriLOSEC) 40 MG capsule, Take 40 mg by mouth daily, Disp: , Rfl:   •  Rhubarb (ESTROVEN COMPLETE PO), Take by mouth, Disp: , Rfl:     Allergies   Allergen Reactions   • Morphine Nausea Only   • Sulfa Antibiotics Hives   • Sulfacetamide Sodium-Sulfur Rash            Vitals:    02/21/23 1023   BP: 152/84   Pulse: 90       Objective:  Physical exam  · General: Awake, Alert, Oriented  · Eyes: Pupils equal, round and reactive to light  · Heart: regular rate and rhythm  · Lungs: No audible wheezing  · Abdomen: soft                    Ortho Exam  Right knee:  Varus alignment  TTP over medial joint line  Bony enlargement over superior patella  No erythema or ecchymosis  No effusion or swelling  Normal strength  Good ROM with crepitus   Calf compartments soft and supple  Sensation intact  Toes are warm sensate and mobile      Diagnostics, reviewed and taken today if performed as documented: The attending physician has personally reviewed the pertinent films in PACS and interpretation is as follows:  Right knee x-ray:  Severe bone on bone patellofemoral arthritic changes with moderate-severe medial and lateral compartment arthritic changes  Procedures, if performed today:    Procedures    None performed      Portions of the record may have been created with voice recognition software  Occasional wrong word or "sound a like" substitutions may have occurred due to the inherent limitations of voice recognition software  Read the chart carefully and recognize, using context, where substitutions have occurred

## 2023-03-30 DIAGNOSIS — M25.561 RIGHT KNEE PAIN, UNSPECIFIED CHRONICITY: ICD-10-CM

## 2023-03-30 DIAGNOSIS — M17.11 PRIMARY OSTEOARTHRITIS OF RIGHT KNEE: ICD-10-CM

## 2023-03-30 RX ORDER — ASCORBIC ACID 500 MG
TABLET ORAL
Qty: 30 TABLET | Refills: 0 | Status: SHIPPED | OUTPATIENT
Start: 2023-03-30

## 2023-04-03 ENCOUNTER — TELEPHONE (OUTPATIENT)
Dept: OBGYN CLINIC | Facility: HOSPITAL | Age: 63
End: 2023-04-03

## 2023-04-03 DIAGNOSIS — M25.561 RIGHT KNEE PAIN, UNSPECIFIED CHRONICITY: Primary | ICD-10-CM

## 2023-04-03 NOTE — TELEPHONE ENCOUNTER
Preoperative Elective Admission Assessment    Living Situation:  Lives with  in 2 floor home  #2 steps to enter through garage  To bedroom level, #12 steps with handrail  First Floor Setup: pt can sleep on couch and there is a full bath with a walk in shower  DME: does have a RW  Patient's Current Level of Function: independent with ADLs and ambulation  Post-op Caregiver:      Post-op Transport:       Outpatient Physical Therapy Site:  Site: [facility]  pre and post-op appts scheduled? Y     Medication Management: self   Preferred Pharmacy for Post-op Medications[facility]   Blood Management Vitamin Regimen: is taking as prescribed, denies questions or concerns  Post-op anticoagulant: has lovenox at home, ready for post-op use  DC Plan:pt plans for DC to home and plans to attend OP PT  Barriers to DC identified preoperatively: None    BMI:     Care companion: agreeable and enrolled  Patient Education:  Pt educated on post-op pain, early mobilization (POD0), Average inpt LOS, OP PT goal   Patient educated that our goal is to appropriately discharge patient based off their post-op function while striving to maintain maximal independence  The goal is to discharge patient to home and for them to attend outpatient physical therapy  Assigned to care team? Y    CHW/SW referral: N/A    Pt encouraged to call with any questions, concerns or issues

## 2023-04-12 NOTE — PRE-PROCEDURE INSTRUCTIONS
Pre-Surgery Instructions:   Medication Instructions    ascorbic acid (VITAMIN C) 500 mg tablet Instructions provided by MD Сергей Bryan cholecalciferol (VITAMIN D3) 25 mcg (1,000 units) tablet Stop taking 7 days prior to surgery   ferrous sulfate 324 (65 Fe) mg Instructions provided by MD Сергей Bryan folic acid (FOLVITE) 1 mg tablet Instructions provided by MD Сергей Bryan levothyroxine 75 mcg tablet Take day of surgery   multivitamin (THERAGRAN) TABS Instructions provided by MD    omeprazole (PriLOSEC) 40 MG capsule Take night before surgery    Rhubarb (ESTROVEN COMPLETE PO) Stop taking 7 days prior to surgery  Covid screening negative as per patient  Reviewed with patient via phone all medication instructions  Advised not to take any NSAID's, Vitamins not ordered by Surgeon or Herbal products for 7 days prior to the DOS  Acetaminophen products are ok to take  Instructed about NPO after midnight the night before DOS, except sips of water with medications allowed for the morning of the DOS  Reviewed showering instructions as given by Surgical office  Confirmed Ortho class and Incentive Spirometry Education was given by Surg office  Instructed to call Surgical office with any questions  Informed about call from [facility] with the time to arrive for the scheduled surgery  Patient verbalized understanding

## 2023-04-13 PROBLEM — D58.2 ELEVATED HEMOGLOBIN (HCC): Status: ACTIVE | Noted: 2020-11-04

## 2023-04-13 PROBLEM — N18.2 STAGE 2 CHRONIC KIDNEY DISEASE: Status: ACTIVE | Noted: 2020-11-04

## 2023-04-19 DIAGNOSIS — Z47.1 AFTERCARE FOLLOWING RIGHT KNEE JOINT REPLACEMENT SURGERY: Primary | ICD-10-CM

## 2023-04-19 DIAGNOSIS — Z96.651 AFTERCARE FOLLOWING RIGHT KNEE JOINT REPLACEMENT SURGERY: Primary | ICD-10-CM

## 2023-04-24 ENCOUNTER — EVALUATION (OUTPATIENT)
Dept: PHYSICAL THERAPY | Facility: CLINIC | Age: 63
End: 2023-04-24

## 2023-04-24 DIAGNOSIS — M17.11 PRIMARY OSTEOARTHRITIS OF RIGHT KNEE: Primary | ICD-10-CM

## 2023-04-24 NOTE — PROGRESS NOTES
PT EVALUATION    Today's date: 23  Patient name: Kai Del Cid  : 1960  MRN: [id number]  Referring provider: Elly Adhikari  Dx:   1  Primary osteoarthritis of right knee        Kai Del Cid is a 70 y o  female who presents for pre-op right TKA evaluation  She has been dealing with knee pain for years and has decreased tolerance to all weight bearing activities  She has episodes of 10/10 sharp pain and occasional locking of the knee joint  She was given home exercises which she will continue with until next treatment after surgery  This patient would benefit from skilled physical therapy following their surgery to address their listed impairments and functional limitations to maximize functional outcome  Impairments:    restricted ROM    decreased strength   pain with function   activity intolerance   weight bearing intolerance     Prognosis:  Good  Positive and negative prognostic indicator(s):  pain >3 months    Goals:    STG Patient is independent with HEP   STG Range of motion is improved by 25% in 2 weeks  LTG Range of motion is improved by 50% in 4 weeks  LTG Increase strength full grade in 4 weeks  LTG ADL performance improved to prior level of function in 6 weeks  LTG Weight bearing tolerance improved 50% in 4 weeks     Planned interventions:  home exercise program, patient education, manual therapy, graded activity, flexibility, functional range of motion exercises and strengthening    Duration in visits:  12  Frequency: 2 visits per week  Duration in weeks:  6    History of Current Injury: patient reports long history of right knee pain  She has had physical therapy, injections, and prior arthroscopic surgery  She has pain with all weight bearing activities and wears a brace for support  Descending stairs is very tough as well as getting up and down from chairs  Pain is sharp and her knee will lock in slight flexion at times        Pain location: anterior knee  Pain descriptors:  Sharp, popping, locking  Pain intensity:  10/10 at it's peak    Aggravating factors: all weight bearing activities  Easing factors: brace for stability    Patient goals:  decreased pain, independence with ADLs, increased mobility and return to recreation    Objective     Active Range of Motion     Right Knee   Flexion: 100 degrees   Extensor la degrees     Passive Range of Motion     Right Knee   Flexion: 118 degrees   Extension: 2 degrees     Strength/Myotome Testing     Right Knee   Flexion: 4  Extension: 4          Precautions: none      Manuals             Right knee manual stretching                                                    Neuro Re-Ed                                                                                                        Ther Ex             Quad sets             Ankle pumps             Standing hip 3-way             Side stepping             Mini squats             Step ups             Heel slides             pball leg curls             Heel prop             Standing knee flexion                                       Ther Activity                                       Gait Training                                       Modalities

## 2023-04-30 ENCOUNTER — ANESTHESIA EVENT (OUTPATIENT)
Dept: PERIOP | Facility: HOSPITAL | Age: 63
End: 2023-04-30

## 2023-05-01 ENCOUNTER — ANESTHESIA (OUTPATIENT)
Dept: PERIOP | Facility: HOSPITAL | Age: 63
End: 2023-05-01

## 2023-05-01 ENCOUNTER — HOSPITAL ENCOUNTER (OUTPATIENT)
Facility: HOSPITAL | Age: 63
Setting detail: OUTPATIENT SURGERY
Discharge: HOME | End: 2023-05-02
Attending: ORTHOPAEDIC SURGERY | Admitting: ORTHOPAEDIC SURGERY

## 2023-05-01 DIAGNOSIS — M17.11 PRIMARY OSTEOARTHRITIS OF RIGHT KNEE: Primary | ICD-10-CM

## 2023-05-01 DIAGNOSIS — Z96.651 S/P TOTAL KNEE ARTHROPLASTY, RIGHT: ICD-10-CM

## 2023-05-01 LAB
ABO GROUP BLD: NORMAL
BLD GP AB SCN SERPL QL: NEGATIVE
GLUCOSE SERPL-MCNC: 106 MG/DL (ref 65–140)
RH BLD: POSITIVE
SPECIMEN EXPIRATION DATE: NORMAL

## 2023-05-01 DEVICE — SMARTSET HV HIGH VISCOSITY BONE CEMENT 40G
Type: IMPLANTABLE DEVICE | Site: KNEE | Status: FUNCTIONAL
Brand: SMARTSET

## 2023-05-01 DEVICE — ATTUNE PATELLA MEDIALIZED DOME 38MM CEMENTED AOX
Type: IMPLANTABLE DEVICE | Site: KNEE | Status: FUNCTIONAL
Brand: ATTUNE

## 2023-05-01 DEVICE — ATTUNE KNEE SYSTEM TIBIAL INSERT FIXED BEARING POSTERIOR STABILIZED 6 5MM AOX
Type: IMPLANTABLE DEVICE | Site: KNEE | Status: FUNCTIONAL
Brand: ATTUNE

## 2023-05-01 DEVICE — ATTUNE KNEE SYSTEM FEMORAL POSTERIOR STABILIZED NARROW SIZE 6N RIGHT CEMENTED
Type: IMPLANTABLE DEVICE | Site: KNEE | Status: FUNCTIONAL
Brand: ATTUNE

## 2023-05-01 DEVICE — ATTUNE KNEE SYSTEM TIBIAL BASE FIXED BEARING SIZE 5 CEMENTED
Type: IMPLANTABLE DEVICE | Site: KNEE | Status: FUNCTIONAL
Brand: ATTUNE

## 2023-05-01 RX ORDER — PHENYLEPHRINE HCL IN 0.9% NACL 1 MG/10 ML
SYRINGE (ML) INTRAVENOUS AS NEEDED
Status: DISCONTINUED | OUTPATIENT
Start: 2023-05-01 | End: 2023-05-01

## 2023-05-01 RX ORDER — PROPOFOL 10 MG/ML
INJECTION, EMULSION INTRAVENOUS CONTINUOUS PRN
Status: DISCONTINUED | OUTPATIENT
Start: 2023-05-01 | End: 2023-05-01

## 2023-05-01 RX ORDER — MIDAZOLAM HYDROCHLORIDE 2 MG/2ML
INJECTION, SOLUTION INTRAMUSCULAR; INTRAVENOUS AS NEEDED
Status: DISCONTINUED | OUTPATIENT
Start: 2023-05-01 | End: 2023-05-01

## 2023-05-01 RX ORDER — ONDANSETRON 2 MG/ML
INJECTION INTRAMUSCULAR; INTRAVENOUS AS NEEDED
Status: DISCONTINUED | OUTPATIENT
Start: 2023-05-01 | End: 2023-05-01

## 2023-05-01 RX ORDER — PANTOPRAZOLE SODIUM 40 MG/1
40 TABLET, DELAYED RELEASE ORAL
Status: DISCONTINUED | OUTPATIENT
Start: 2023-05-01 | End: 2023-05-02 | Stop reason: HOSPADM

## 2023-05-01 RX ORDER — ENOXAPARIN SODIUM 100 MG/ML
40 INJECTION SUBCUTANEOUS DAILY
Status: DISCONTINUED | OUTPATIENT
Start: 2023-05-01 | End: 2023-05-02 | Stop reason: HOSPADM

## 2023-05-01 RX ORDER — SODIUM CHLORIDE, SODIUM LACTATE, POTASSIUM CHLORIDE, CALCIUM CHLORIDE 600; 310; 30; 20 MG/100ML; MG/100ML; MG/100ML; MG/100ML
125 INJECTION, SOLUTION INTRAVENOUS CONTINUOUS
Status: DISCONTINUED | OUTPATIENT
Start: 2023-05-01 | End: 2023-05-01

## 2023-05-01 RX ORDER — SODIUM CHLORIDE, SODIUM LACTATE, POTASSIUM CHLORIDE, CALCIUM CHLORIDE 600; 310; 30; 20 MG/100ML; MG/100ML; MG/100ML; MG/100ML
125 INJECTION, SOLUTION INTRAVENOUS CONTINUOUS
Status: DISCONTINUED | OUTPATIENT
Start: 2023-05-01 | End: 2023-05-02 | Stop reason: HOSPADM

## 2023-05-01 RX ORDER — LEVOTHYROXINE SODIUM 0.07 MG/1
75 TABLET ORAL
Status: DISCONTINUED | OUTPATIENT
Start: 2023-05-02 | End: 2023-05-02 | Stop reason: HOSPADM

## 2023-05-01 RX ORDER — OXYCODONE HYDROCHLORIDE 5 MG/1
5 TABLET ORAL EVERY 4 HOURS PRN
Qty: 30 TABLET | Refills: 0 | Status: SHIPPED | OUTPATIENT
Start: 2023-05-01 | End: 2023-05-09

## 2023-05-01 RX ORDER — GABAPENTIN 300 MG/1
300 CAPSULE ORAL ONCE
Status: COMPLETED | OUTPATIENT
Start: 2023-05-01 | End: 2023-05-01

## 2023-05-01 RX ORDER — BUPIVACAINE HYDROCHLORIDE 5 MG/ML
INJECTION, SOLUTION PERINEURAL
Status: DISCONTINUED | OUTPATIENT
Start: 2023-05-01 | End: 2023-05-01

## 2023-05-01 RX ORDER — OXYCODONE HYDROCHLORIDE 5 MG/1
10 TABLET ORAL EVERY 4 HOURS PRN
Status: DISCONTINUED | OUTPATIENT
Start: 2023-05-01 | End: 2023-05-02 | Stop reason: HOSPADM

## 2023-05-01 RX ORDER — LIDOCAINE HYDROCHLORIDE 10 MG/ML
INJECTION, SOLUTION EPIDURAL; INFILTRATION; INTRACAUDAL; PERINEURAL
Status: DISCONTINUED | OUTPATIENT
Start: 2023-05-01 | End: 2023-05-01

## 2023-05-01 RX ORDER — CALCIUM CARBONATE 200(500)MG
1000 TABLET,CHEWABLE ORAL DAILY PRN
Status: DISCONTINUED | OUTPATIENT
Start: 2023-05-01 | End: 2023-05-02 | Stop reason: HOSPADM

## 2023-05-01 RX ORDER — GABAPENTIN 300 MG/1
300 CAPSULE ORAL
Status: DISCONTINUED | OUTPATIENT
Start: 2023-05-01 | End: 2023-05-02 | Stop reason: HOSPADM

## 2023-05-01 RX ORDER — HYDROMORPHONE HCL/PF 1 MG/ML
0.5 SYRINGE (ML) INJECTION EVERY 2 HOUR PRN
Status: DISCONTINUED | OUTPATIENT
Start: 2023-05-01 | End: 2023-05-02 | Stop reason: HOSPADM

## 2023-05-01 RX ORDER — HYDRALAZINE HYDROCHLORIDE 25 MG/1
25 TABLET, FILM COATED ORAL EVERY 8 HOURS PRN
Status: DISCONTINUED | OUTPATIENT
Start: 2023-05-01 | End: 2023-05-02 | Stop reason: HOSPADM

## 2023-05-01 RX ORDER — FENTANYL CITRATE/PF 50 MCG/ML
50 SYRINGE (ML) INJECTION
Status: DISCONTINUED | OUTPATIENT
Start: 2023-05-01 | End: 2023-05-01

## 2023-05-01 RX ORDER — ACETAMINOPHEN 325 MG/1
975 TABLET ORAL EVERY 8 HOURS
Status: DISCONTINUED | OUTPATIENT
Start: 2023-05-01 | End: 2023-05-02 | Stop reason: HOSPADM

## 2023-05-01 RX ORDER — CHLORHEXIDINE GLUCONATE 0.12 MG/ML
15 RINSE ORAL ONCE
Status: COMPLETED | OUTPATIENT
Start: 2023-05-01 | End: 2023-05-01

## 2023-05-01 RX ORDER — DEXAMETHASONE SODIUM PHOSPHATE 10 MG/ML
INJECTION, SOLUTION INTRAMUSCULAR; INTRAVENOUS AS NEEDED
Status: DISCONTINUED | OUTPATIENT
Start: 2023-05-01 | End: 2023-05-01

## 2023-05-01 RX ORDER — OXYCODONE HYDROCHLORIDE 5 MG/1
5 TABLET ORAL EVERY 4 HOURS PRN
Status: DISCONTINUED | OUTPATIENT
Start: 2023-05-01 | End: 2023-05-02 | Stop reason: HOSPADM

## 2023-05-01 RX ORDER — EPHEDRINE SULFATE 50 MG/ML
INJECTION INTRAVENOUS AS NEEDED
Status: DISCONTINUED | OUTPATIENT
Start: 2023-05-01 | End: 2023-05-01

## 2023-05-01 RX ORDER — ONDANSETRON 2 MG/ML
4 INJECTION INTRAMUSCULAR; INTRAVENOUS EVERY 6 HOURS PRN
Status: DISCONTINUED | OUTPATIENT
Start: 2023-05-01 | End: 2023-05-02 | Stop reason: HOSPADM

## 2023-05-01 RX ORDER — HYDROMORPHONE HCL/PF 1 MG/ML
0.5 SYRINGE (ML) INJECTION
Status: DISCONTINUED | OUTPATIENT
Start: 2023-05-01 | End: 2023-05-01

## 2023-05-01 RX ORDER — METHOCARBAMOL 500 MG/1
500 TABLET, FILM COATED ORAL EVERY 6 HOURS SCHEDULED
Status: DISCONTINUED | OUTPATIENT
Start: 2023-05-01 | End: 2023-05-01

## 2023-05-01 RX ORDER — METOCLOPRAMIDE HYDROCHLORIDE 5 MG/ML
10 INJECTION INTRAMUSCULAR; INTRAVENOUS ONCE AS NEEDED
Status: DISCONTINUED | OUTPATIENT
Start: 2023-05-01 | End: 2023-05-01

## 2023-05-01 RX ORDER — MAGNESIUM HYDROXIDE 1200 MG/15ML
LIQUID ORAL AS NEEDED
Status: DISCONTINUED | OUTPATIENT
Start: 2023-05-01 | End: 2023-05-01 | Stop reason: HOSPADM

## 2023-05-01 RX ORDER — ACETAMINOPHEN 325 MG/1
975 TABLET ORAL ONCE
Status: COMPLETED | OUTPATIENT
Start: 2023-05-01 | End: 2023-05-01

## 2023-05-01 RX ORDER — FENTANYL CITRATE 50 UG/ML
INJECTION, SOLUTION INTRAMUSCULAR; INTRAVENOUS AS NEEDED
Status: DISCONTINUED | OUTPATIENT
Start: 2023-05-01 | End: 2023-05-01

## 2023-05-01 RX ORDER — METHOCARBAMOL 500 MG/1
500 TABLET, FILM COATED ORAL EVERY 6 HOURS PRN
Status: DISCONTINUED | OUTPATIENT
Start: 2023-05-01 | End: 2023-05-02 | Stop reason: HOSPADM

## 2023-05-01 RX ORDER — SENNOSIDES 8.6 MG
1 TABLET ORAL DAILY
Status: DISCONTINUED | OUTPATIENT
Start: 2023-05-01 | End: 2023-05-02 | Stop reason: HOSPADM

## 2023-05-01 RX ORDER — CEFAZOLIN SODIUM 2 G/50ML
2000 SOLUTION INTRAVENOUS ONCE
Status: COMPLETED | OUTPATIENT
Start: 2023-05-01 | End: 2023-05-01

## 2023-05-01 RX ORDER — CEFAZOLIN SODIUM 2 G/50ML
2000 SOLUTION INTRAVENOUS EVERY 8 HOURS
Status: COMPLETED | OUTPATIENT
Start: 2023-05-01 | End: 2023-05-02

## 2023-05-01 RX ADMIN — BUPIVACAINE HYDROCHLORIDE 10 ML: 5 INJECTION, SOLUTION PERINEURAL at 07:15

## 2023-05-01 RX ADMIN — ONDANSETRON 4 MG: 2 INJECTION INTRAMUSCULAR; INTRAVENOUS at 07:51

## 2023-05-01 RX ADMIN — GABAPENTIN 300 MG: 300 CAPSULE ORAL at 21:23

## 2023-05-01 RX ADMIN — EPHEDRINE SULFATE 5 MG: 50 INJECTION INTRAVENOUS at 08:21

## 2023-05-01 RX ADMIN — SODIUM CHLORIDE, SODIUM LACTATE, POTASSIUM CHLORIDE, AND CALCIUM CHLORIDE: .6; .31; .03; .02 INJECTION, SOLUTION INTRAVENOUS at 08:22

## 2023-05-01 RX ADMIN — EPHEDRINE SULFATE 5 MG: 50 INJECTION INTRAVENOUS at 08:38

## 2023-05-01 RX ADMIN — Medication 50 MCG: at 08:21

## 2023-05-01 RX ADMIN — Medication 100 MCG: at 08:06

## 2023-05-01 RX ADMIN — HYDROMORPHONE HYDROCHLORIDE 0.5 MG: 1 INJECTION, SOLUTION INTRAMUSCULAR; INTRAVENOUS; SUBCUTANEOUS at 14:39

## 2023-05-01 RX ADMIN — METHOCARBAMOL 500 MG: 500 TABLET ORAL at 12:44

## 2023-05-01 RX ADMIN — CEFAZOLIN SODIUM 2000 MG: 2 SOLUTION INTRAVENOUS at 07:28

## 2023-05-01 RX ADMIN — DEXAMETHASONE SODIUM PHOSPHATE 10 MG: 10 INJECTION, SOLUTION INTRAMUSCULAR; INTRAVENOUS at 07:50

## 2023-05-01 RX ADMIN — ONDANSETRON 4 MG: 2 INJECTION INTRAMUSCULAR; INTRAVENOUS at 15:04

## 2023-05-01 RX ADMIN — ACETAMINOPHEN 975 MG: 325 TABLET ORAL at 06:06

## 2023-05-01 RX ADMIN — LIDOCAINE HYDROCHLORIDE 5 ML: 10 INJECTION, SOLUTION EPIDURAL; INFILTRATION; INTRACAUDAL; PERINEURAL at 07:10

## 2023-05-01 RX ADMIN — SENNOSIDES 8.6 MG: 8.6 TABLET, FILM COATED ORAL at 10:06

## 2023-05-01 RX ADMIN — Medication 50 MCG: at 08:38

## 2023-05-01 RX ADMIN — SODIUM CHLORIDE, SODIUM LACTATE, POTASSIUM CHLORIDE, AND CALCIUM CHLORIDE: .6; .31; .03; .02 INJECTION, SOLUTION INTRAVENOUS at 07:23

## 2023-05-01 RX ADMIN — TRANEXAMIC ACID 1000 MG: 100 INJECTION INTRAVENOUS at 07:38

## 2023-05-01 RX ADMIN — Medication 50 MCG: at 08:12

## 2023-05-01 RX ADMIN — EPHEDRINE SULFATE 5 MG: 50 INJECTION INTRAVENOUS at 08:03

## 2023-05-01 RX ADMIN — CEFAZOLIN SODIUM 2000 MG: 2 SOLUTION INTRAVENOUS at 23:51

## 2023-05-01 RX ADMIN — OXYCODONE HYDROCHLORIDE 10 MG: 5 TABLET ORAL at 21:23

## 2023-05-01 RX ADMIN — PANTOPRAZOLE SODIUM 40 MG: 40 TABLET, DELAYED RELEASE ORAL at 10:06

## 2023-05-01 RX ADMIN — OXYCODONE HYDROCHLORIDE 10 MG: 5 TABLET ORAL at 12:44

## 2023-05-01 RX ADMIN — EPHEDRINE SULFATE 5 MG: 50 INJECTION INTRAVENOUS at 08:12

## 2023-05-01 RX ADMIN — BUPIVACAINE HYDROCHLORIDE 10 ML: 5 INJECTION, SOLUTION PERINEURAL at 07:10

## 2023-05-01 RX ADMIN — CHLORHEXIDINE GLUCONATE 15 ML: 1.2 SOLUTION ORAL at 06:06

## 2023-05-01 RX ADMIN — ACETAMINOPHEN 975 MG: 325 TABLET ORAL at 17:46

## 2023-05-01 RX ADMIN — BUPIVACAINE 20 ML: 13.3 INJECTION, SUSPENSION, LIPOSOMAL INFILTRATION at 07:20

## 2023-05-01 RX ADMIN — GABAPENTIN 300 MG: 300 CAPSULE ORAL at 06:06

## 2023-05-01 RX ADMIN — Medication 50 MCG: at 08:45

## 2023-05-01 RX ADMIN — LIDOCAINE HYDROCHLORIDE 5 ML: 10 INJECTION, SOLUTION EPIDURAL; INFILTRATION; INTRACAUDAL; PERINEURAL at 07:05

## 2023-05-01 RX ADMIN — FENTANYL CITRATE 50 MCG: 50 INJECTION INTRAMUSCULAR; INTRAVENOUS at 07:15

## 2023-05-01 RX ADMIN — ACETAMINOPHEN 975 MG: 325 TABLET ORAL at 23:51

## 2023-05-01 RX ADMIN — EPHEDRINE SULFATE 10 MG: 50 INJECTION INTRAVENOUS at 07:51

## 2023-05-01 RX ADMIN — PROPOFOL 100 MCG/KG/MIN: 10 INJECTION, EMULSION INTRAVENOUS at 07:44

## 2023-05-01 RX ADMIN — EPHEDRINE SULFATE 10 MG: 50 INJECTION INTRAVENOUS at 07:57

## 2023-05-01 RX ADMIN — Medication 1 TABLET: at 10:06

## 2023-05-01 RX ADMIN — ENOXAPARIN SODIUM 40 MG: 40 INJECTION SUBCUTANEOUS at 20:19

## 2023-05-01 RX ADMIN — EPHEDRINE SULFATE 5 MG: 50 INJECTION INTRAVENOUS at 08:45

## 2023-05-01 RX ADMIN — EPHEDRINE SULFATE 5 MG: 50 INJECTION INTRAVENOUS at 08:33

## 2023-05-01 RX ADMIN — MIDAZOLAM 2 MG: 1 INJECTION INTRAMUSCULAR; INTRAVENOUS at 07:10

## 2023-05-01 RX ADMIN — CEFAZOLIN SODIUM 2000 MG: 2 SOLUTION INTRAVENOUS at 14:39

## 2023-05-01 RX ADMIN — SODIUM CHLORIDE, SODIUM LACTATE, POTASSIUM CHLORIDE, AND CALCIUM CHLORIDE 125 ML/HR: .6; .31; .03; .02 INJECTION, SOLUTION INTRAVENOUS at 20:19

## 2023-05-01 RX ADMIN — FENTANYL CITRATE 50 MCG: 50 INJECTION INTRAMUSCULAR; INTRAVENOUS at 08:54

## 2023-05-01 RX ADMIN — Medication 50 MCG: at 08:33

## 2023-05-01 NOTE — PLAN OF CARE
Problem: PHYSICAL THERAPY ADULT  Goal: Performs mobility at highest level of function for planned discharge setting  See evaluation for individualized goals  Description: Treatment/Interventions: Functional transfer training, LE strengthening/ROM, Elevations, Therapeutic exercise, Endurance training, Patient/family training, Equipment eval/education, Bed mobility, Gait training, Compensatory technique education, Spoke to nursing, Family  Equipment Recommended:  (pt already has RW + SPC (RW adjusted to correct height))       See flowsheet documentation for full assessment, interventions and recommendations  Note: Prognosis: Excellent  Problem List: Decreased strength, Decreased range of motion, Decreased endurance, Impaired balance, Decreased mobility, Pain  Assessment: Pt is 70 y o  female seen for a high complexity PT evaluation s/p [facility]/2023, s/p R TKA  Of note pt has hx of L TKA 10 years ago + history of arthroscopy and subchondroplasty R knee over the past 5 yearsPlease see above for other active problem list / PMH  PT now consulted to assess functional mobility and needs for safe d/c planning  Prior to admission, pt was I w/ ambulation w/o AD, lives w/ spouse in a house w/ stairs  Currently pt requires MinAx1 for bed skills; CGA for functional transfers; MinAx1 for ambulation w/ RW  Pt presents functioning below baseline and w/ overall mobility deficits 2* to: decreased LE strength; decreased R knee ROM; decreased endurance; impaired balance; gait deviations; pain; fatigue; multiple lines  These impairments place pt at risk for falls  Pt will continue to benefit from skilled PT interventions to address stated impairments; to maximize functional potential; for ongoing pt/ family training; and DME needs  PT is currently recommending OP PT    Barriers to Discharge: Inaccessible home environment     PT Discharge Recommendation: Home with outpatient rehabilitation    See flowsheet documentation for full assessment

## 2023-05-01 NOTE — ANESTHESIA POSTPROCEDURE EVALUATION
Post-Op Assessment Note    CV Status:  Stable  Pain Score: 0    Pain management: adequate     Mental Status:  Alert and awake   Hydration Status:  Stable   PONV Controlled:  None   Airway Patency:  Patent      Post Op Vitals Reviewed: Yes      Staff: CRNA         No notable events documented      BP  125/76    Temp 97 6   Pulse 86   Resp 11   SpO2 100

## 2023-05-01 NOTE — PHYSICAL THERAPY NOTE
Physical Therapy Evaluation    Patient's Name: Toño Saunders    Admitting Diagnosis  Primary osteoarthritis of right knee [M17 11]  Right knee pain, unspecified chronicity [M25 561]    Problem List  Patient Active Problem List   Diagnosis    Hypothyroidism    Stage 2 chronic kidney disease    Primary osteoarthritis of right knee    Need for prophylactic vaccination and inoculation against single disease    Encounter for long-term (current) use of other medications    Elevated hemoglobin (HCC)    BMI 30 0-30 9,adult    S/P total knee arthroplasty, right       Past Medical History  Past Medical History:   Diagnosis Date    Disease of thyroid gland     GERD (gastroesophageal reflux disease)        Past Surgical History  Past Surgical History:   Procedure Laterality Date    ANTERIOR CRUCIATE LIGAMENT REPAIR Bilateral     COLONOSCOPY  2017    normal    JOINT REPLACEMENT          05/01/23 1405   PT Last Visit   PT Visit Date 05/01/23   Note Type   Note type Evaluation   Pain Assessment   Pain Assessment Tool 0-10   Pain Score 4   Pain Location/Orientation Orientation: Right;Location: Knee   Hospital Pain Intervention(s) Cold applied;Elevated; Ambulation/increased activity   Restrictions/Precautions   Weight Bearing Precautions Per Order Yes   RLE Weight Bearing Per Order WBAT   Braces or Orthoses Other (Comment)  (pt worse R knee compression sleeve w/ walking/working out at baseline)   Other Precautions Multiple lines; Fall Risk;Pain   Home Living   Type of [address] [place] [address] Two level  (2 ANGIE)   Bathroom Shower/Tub Walk-in shower   Home Equipment Walker;Cane   Prior Function   Level of [place] Independent with ADLs; Independent with functional mobility; Independent with IADLS  (I w/ ambulation w/o AD)   Lives With Spouse   Receives Help From Family   IADLs Independent with driving; Independent with meal prep; Independent with medication management   Vocational Retired  (worked at [employer])   Comments has a recumbent bike at home which she likes to use for exercise   General   Family/Caregiver Present Yes  (spouse)   Cognition   Arousal/Participation Alert   Orientation Level Oriented X4   Comments pleasant + cooperative   Subjective   Subjective Pt agreeable to mobilize  RLE Assessment   RLE Assessment   (grossly 3-/5)   LLE Assessment   LLE Assessment WFL   Coordination   Movements are Fluid and Coordinated 1   Sensation WFL   Bed Mobility   Supine to Sit 4  Minimal assistance   Additional items Assist x 1; Increased time required;Verbal cues;LE management   Sit to Supine Unable to assess   Additional Comments Pt greeted in supine  Transfers   Sit to Stand   (CGA)   Additional items Assist x 1; Increased time required;Verbal cues   Stand to Sit   (CGA)   Additional items Assist x 1; Increased time required;Verbal cues   Additional Comments RW   Ambulation/Elevation   Gait pattern Excessively slow; Short stride;Decreased foot clearance; Antalgic;Decreased heel strike   Gait Assistance 4  Minimal assist   Additional items Assist x 1;Verbal cues; Tactile cues   Assistive Device Rolling walker   Distance 70'x2   Stair Management Assistance Not tested   Balance   Static Sitting Fair +   Dynamic Sitting Fair   Static Standing Fair -   Dynamic Standing Poor +   Ambulatory Poor +  (RW)   Endurance Deficit   Endurance Deficit Yes   Endurance Deficit Description pain, fatigue   Activity Tolerance   Activity Tolerance Patient limited by pain; Patient limited by fatigue   Nurse Made Aware yes - observed pt's functional mobility   Assessment   Prognosis Excellent   Problem List Decreased strength;Decreased range of motion;Decreased endurance; Impaired balance;Decreased mobility;Pain   Assessment Pt is 70 y o  female seen for a high complexity PT evaluation s/p [facility], s/p R TKA  Of note pt has hx of L TKA 10 years ago + history of arthroscopy and subchondroplasty R knee over the past 5 years  Please see above for other active problem list / PMH  PT now consulted to assess functional mobility and needs for safe d/c planning  Prior to admission, pt was I w/ ambulation w/o AD, lives w/ spouse in a house w/ stairs  Currently pt requires MinAx1 for bed skills; CGA for functional transfers; MinAx1 for ambulation w/ RW  Pt presents functioning below baseline and w/ overall mobility deficits 2* to: decreased LE strength; decreased R knee ROM; decreased endurance; impaired balance; gait deviations; pain; fatigue; multiple lines  These impairments place pt at risk for falls  Pt will continue to benefit from skilled PT interventions to address stated impairments; to maximize functional potential; for ongoing pt/ family training; and DME needs  PT is currently recommending OP PT  Barriers to Discharge Inaccessible home environment   Goals   Patient Goals walk without pain   STG Expiration Date 05/11/23   Short Term Goal #1 In 10 days pt will complete: 1) Bed mobility skills with Sherry to facilitate safe return to previous living environment  2) Functional transfers with Sherry to facilitate safe return to previous living environment  3) Ambulation with ' w/ Sherry without LOB for safe ambulation in home/community environment  4) Improve balance scores by 1 grade to decrease fall risk  5) Improve LE strength grades by 1 to increase independence w/ all functional mobility, transfers and gait  6) PT for ongoing pt and family education; DME needs and D/C planning to promote highest level of function in least restrictive environment  7) Stair training up/ down 12 steps with most appropriate technique and Sherry for safe access to previous living environment and to increase community access  PT Treatment Day 0   Plan   Treatment/Interventions Functional transfer training;LE strengthening/ROM; Elevations; Therapeutic exercise; Endurance training;Patient/family training;Equipment eval/education; Bed mobility;Gait training; Compensatory technique education;Spoke to nursing;Family   PT Frequency Twice a day   Recommendation   PT Discharge Recommendation Home with outpatient rehabilitation   Equipment Recommended   (pt already has RW + SPC (RW adjusted to correct height))   AM-PAC Basic Mobility Inpatient   Turning in Flat Bed Without Bedrails 3   Lying on Back to Sitting on Edge of Flat Bed Without Bedrails 3   Moving Bed to Chair 3   Standing Up From Chair Using Arms 3   Walk in Room 3   Climb 3-5 Stairs With Railing 2   Basic Mobility Inpatient Raw Score 17   Basic Mobility Standardized Score 39 67   Highest Level Of Mobility   JH-HLM Goal 5: Stand one or more mins   JH-HLM Achieved 7: Walk 25 feet or more   Additional Treatment Session   Treatment Assessment Pt educated on WBAT status, to alternate between knee flexion and extension, + to never rest with pillow under R knee  End of Consult   Patient Position at End of Consult Bedside chair; All needs within reach  (on waffle cushion, BLE elevated, all lines in tact, CP to R knee, SCD's activated, spouse at bedside)     Lisa Rogers, PT, DPT

## 2023-05-01 NOTE — CONSULTS
Internal Medicine  Consultation Note    Patient: Jodi Shoemaker  Age/sex: 70 y o  female  Medical Record #: [id number]    Assessment/Plan    Status Post right Total KNEE ARTHROPLASTY   Continue post op pain control measures as prescribed   Follow bowel regimen to help decrease narcotic induced constipation    Follow post operative hemoglobin with serial CBC and treat accordingly   Monitor WBC and fever curve post op while encouraging use of incentive spirometer   DVT prophylaxis in place and reviewed  Hypothyroidism  · Continue levothyroxine  GERD  · Continue PPI  · Monitor for nausea/vomiting post-op  PRE-OP HGB LEVEL: 14 7    Subjective/ HPI: Jodi Shoemaker was seen and examined  Hx of KNEE pain failed out patient conservative measures  Elected to undergo total KNEE arthroplasty We are asked to see patient for post op management of underlying medical co-morbidities as outlined above  Pt did well intra and post operatively with good hemodynamics  Pt currently comfortable and without any reported post op nausea  ROS:   A 10 point ROS was performed; negative except as noted above       Social History:    Substance Use History:   Social History     Substance and Sexual Activity   Alcohol Use Yes    Comment: occassional     Social History     Tobacco Use   Smoking Status Never   Smokeless Tobacco Never     Social History     Substance and Sexual Activity   Drug Use Never       Family History:    Family History   Problem Relation Age of Onset    Prostate cancer Father         62s or 76s    Breast cancer Maternal Grandmother 48    Breast cancer Paternal Grandmother 56        in her 62s    No Known Problems Mother     Lymphoma Sister     No Known Problems Daughter     No Known Problems Maternal Grandfather     No Known Problems Paternal Grandfather     No Known Problems Sister     No Known Problems Sister     No Known Problems Paternal Aunt          Review of Scheduled Meds:  Current Facility-Administered Medications   Medication Dose Route Frequency Provider Last Rate    acetaminophen  975 mg Oral Q8H Kristel Lockhart PA-C      bupivacaine liposomal  20 mL Infiltration Once Serina Billingsley PA-C      calcium carbonate  1,000 mg Oral Daily PRN Jonatan Rose PA-C      cefazolin  2,000 mg Intravenous Q8H Kristel Lockhart PA-C      enoxaparin  40 mg Subcutaneous Daily Kristel Lockhart PA-C      gabapentin  300 mg Oral HS Jonatan Rose PA-C      HYDROmorphone  0 5 mg Intravenous Q2H PRN Jonatan Rose PA-C      lactated ringers  1,000 mL Intravenous Once PRN Jonatan Rose PA-C      And    lactated ringers  1,000 mL Intravenous Once PRN Jonatan Rose PA-C      lactated ringers  125 mL/hr Intravenous Continuous Serina Billingsley PA-C 125 mL/hr (05/01/23 0905)   Сергей Bryan [START ON 5/2/2023] levothyroxine  75 mcg Oral Early Morning Jonatan Rose PA-C      methocarbamol  500 mg Oral Q6H [address] Jonatan Rose PA-C      multivitamin-minerals  1 tablet Oral Daily Adiel Sutton      oxyCODONE  10 mg Oral Q4H PRN Jonatan Rose PA-C      oxyCODONE  5 mg Oral Q4H PRN Jonatan Rose PA-C      pantoprazole  40 mg Oral Early Morning Jonatan Rose PA-C      senna  1 tablet Oral Daily Kristel Lockhart PA-C      sodium chloride  1,000 mL Intravenous Once PRN Jonatan Rose PA-C      And    sodium chloride  1,000 mL Intravenous Once PRN Jonatan Rose PA-C         Labs: Invalid input(s): LABGLOM, CMP             Results from last 7 days   Lab Units 05/01/23  0910   POC GLUCOSE mg/dl 106       No results found for: Betha Mole, WOUNDCULT, SPUTUMCULTUR    Input and Output Summary (last 24 hours):        Intake/Output Summary (Last 24 hours) at 5/1/2023 0952  Last data filed at 5/1/2023 0930  Gross per 24 hour   Intake 1756 ml   Output 680 ml   Net 1076 ml       Imaging:     No orders to display       [employer] studiesLabs reviewed  *Medications reviewed and reconciled as needed  *Please refer to order section for additional ordered labs studies  *Case discussed with primary attending during morning huddle case rounds    Vitals:   Temp (24hrs), Av 3 °F (36 8 °C), Min:97 9 °F (36 6 °C), Max:98 9 °F (37 2 °C)    Temp:  [97 9 °F (36 6 °C)-98 9 °F (37 2 °C)] 98 °F (36 7 °C)  HR:  [70-84] 70  Resp:  [13-18] 18  BP: (125-167)/(76-94) 151/81  SpO2:  [99 %-100 %] 100 %  Body mass index is 29 23 kg/m²  Physical Exam:   General Appearance: no distress, conversive  HEENT: PERRLA, conjuctiva normal; oropharynx clear; mucous membranes moist;   Neck:  Supple, no lymphadenopathy or thyromegaly  Lungs: CTA, normal respiratory effort, no retractions, expiratory effort normal  CV: regular rate and rhythm , PMI normal   ABD: soft non tender, no masses , no hepatic or splenomegaly  EXT: DP pulses intact, no lymphadenopathy, no edema ;  right KNEE dressing in place  Skin: normal turgor, normal texture, no rash  Psych: affect normal, mood normal  Neuro: AAOx3          Invasive Devices     Peripheral Intravenous Line  Duration           Peripheral IV 23 Distal;Left;Ventral (anterior) Forearm <1 day          Drain  Duration           Closed/Suction Drain Anterior;Right Knee Accordion 10 Fr  <1 day    Urethral Catheter Latex;Straight-tip 16 Fr  <1 day                   Code Status: No Order  Current Length of Stay: 0 day(s)    Total floor / unit time spent today 1 hour  Coordination of patient's care was performed in conjunction with primary service  Time invested included review of patient's labs, vitals, and management of their comorbidities with continued monitoring, examination of patient as well as answering patient questions, documenting her findings and creating progress note in electronic medical record,  ordering appropriate diagnostic testing   Medical decision making for the day was made by supervising physician unless otherwise noted in their attestation statement  ** Please Note: Fluency Direct voice to text software may have been used in the creation of this document   Audio transcription errors may occur**

## 2023-05-01 NOTE — OP NOTE
OPERATIVE REPORT  PATIENT NAME: Cesar Simmons  : 1960  MRN: [id number]  Pt Location:  [facility] OR ROOM 18    Surgery Date: 2023    Surgeon(s) and Role:     * Attila Keene MD - Primary     * Mary Danielle PA-C - Assisting     * Sandra Hill PA-C - Assisting     Preop Diagnosis:  Primary osteoarthritis of right knee [M17 11]  Right knee pain, unspecified chronicity [M25 561]    Post-Op Diagnosis Codes:     * Primary osteoarthritis of right knee [M17 11]     * Right knee pain, unspecified chronicity [M25 561]    Procedure(s):  Right - ARTHROPLASTY KNEE TOTAL W ROBOT    Specimens:  * No specimens in log *    Estimated Blood Loss:   50 mL    Drains:  Closed/Suction Drain Anterior;Right Knee Accordion 10 Fr  (Active)   Number of days: 0       Urethral Catheter Latex;Straight-tip 16 Fr  (Active)   Number of days: 0       Anesthesia Type:   Spinal w/ Femoral Nerve Block     Operative Indications:  Primary osteoarthritis of right knee [M17 11]  Right knee pain, unspecified chronicity [M25 561]    Operative Findings:  depuy attune   Femur-6N   Poly-6   Tibia-5   [id number]    Complications:   None    Knee Technique: Autogenous Tissue Reconstruction  Knee Approach: Medial Parapatellar    Procedure and Technique: Following induction medical level of spinal anesthesia, a Xiong catheters and sterilely introduced into this patient's bladder  Antibiotics were administered  The right thighs and fitted with a thigh-high tourniquet  The right lower extremity underwent sterile prep and drape  The right lower extremity was exsanguinated gravity, and the tourniquet inflated to 300 mmHg  A midline knee incision was carried the knee in flexion  Full-thickness flaps were raised in order to access the extensor mechanism  A medial arthrotomy was created to open up the joint  2 half pins were placed in proximal tibia, 2 half pins were placed on distal femur  In doing so, modules were created    The arrays were then attached to the modules  Checkpoints made in the proximal tibia distal femur  The knee was then registered  The surgery was planned out on the computer, the plan was finalized  The robot was docked  First maneuver of the distal femoral cut followed by the anterior posterior cuts  The chamfer cuts complete the process  Proximal tibia cut was made next  Care was taken preserve the intake of the medial collateral, lateral collateral, posterior structures during these maneuvers  The box cut was made for the posterior stabilized unit, and remnant medial and lateral meniscectomies then performed  Trials were inserted, the knee was taken the range of motion, found to be capable full extension, good flexion, stable throughout  The patella was then resurfaced while utilizing manufactures equipment, was found to be a size 38 mm button  The trial components removed and the knee was prepared for insertion of cemented components  The cemented tibia, cemented femur, trial poly-, cemented patella in place  Excess cement was removed, the knee was brought into extension  The cement was set to cure  The trial poly was taken out, the knee was packed off  The tourniquet was deflated, and hemostasis was secured  The insert polyethylene was then snapped into position  The knee was taken through a final range of motion, found to be A full extension, good flexion, stable throughout, next patella tracking  Satisfied with the extent of surgery, the wounds were flushed with saline and closed  A Betadine soak was initiated  A drain is placed deep brought via a separate lateral stab incision  The arthrotomy was closed with number Vicryl suture  The subcu tissue closed with 2-0 Vicryl suture  The skin was Agosta staples  Sterile dressings were applied    She was then transferred to recovery room in stable condition with plans include physical therapy weight-bear times, she will require DVT prophylaxis with Lovenox  Please note, there is no qualified orthopedic resident was available assist, the assistance of Karol Coughlin PA-C was instrumental in the safe execution of this patient surgery  Sterile the patient position, patient prep drape, intraoperative assistance, wound closure, dressing application, and patient transfers, all of these were performed under my direct supervision   I was present for the entire procedure      Patient Disposition:  PACU             SIGNATURE: Dolores Mcguire MD  DATE: May 1, 2023  TIME: 8:50 AM

## 2023-05-01 NOTE — PROGRESS NOTES
Pastoral Care Progress Note    2023  Patient: Dayton Hanks : 1960  Admission Date & Time: [id number]  MRN: [id number] CSN: [id number]        Visited Pt and sig other to encourage her recovery post-op  We shared knee replacement stories, pain management stories, and I wished her well in the continued recovery

## 2023-05-01 NOTE — ANESTHESIA PROCEDURE NOTES
Peripheral Block    Patient location during procedure: holding area  Start time: 5/1/2023 7:10 AM  Reason for block: at surgeon's request and post-op pain management  Staffing  Performed: Anesthesiologist   Anesthesiologist: Sapphire Quiroz MD  Preanesthetic Checklist  Completed: patient identified, IV checked, site marked, risks and benefits discussed, surgical consent, monitors and equipment checked, pre-op evaluation and timeout performed  Peripheral Block  Patient position: supine  Prep: ChloraPrep  Patient monitoring: continuous pulse ox, frequent blood pressure checks and heart rate  Block type: adductor canal block  Laterality: right  Injection technique: single-shot  Procedures: ultrasound guided, Ultrasound guidance required for the procedure to increase accuracy and safety of medication placement and decrease risk of complications    Ultrasound permanent image savedbupivacaine (MARCAINE) 0 5 % - Perineural   10 mL - 5/1/2023 7:15:00 AM  lidocaine (PF) (XYLOCAINE-MPF) 1 % - Infiltration   5 mL - 5/1/2023 7:10:00 AM  Needle  Needle type: Stimuplex   Needle gauge: 22 G  Needle length: 15 cm  Needle localization: anatomical landmarks and ultrasound guidance  Needle insertion depth: 6 cm  Assessment  Injection assessment: incremental injection, local visualized surrounding nerve on ultrasound, negative aspiration for heme and no paresthesia on injection  Paresthesia pain: none  Heart rate change: no  Slow fractionated injection: yes  Post-procedure:  site cleaned  patient tolerated the procedure well with no immediate complications

## 2023-05-01 NOTE — DISCHARGE INSTR - AVS FIRST PAGE
Discharge Instructions - Orthopedics      Weight Bearing Status:                                           Weight Bearing as tolerated to the right lower extremity  DVT prophylaxis:  Complete course of Lovenox as directed    Pain:  Continue analgesics as directed    Showering Instructions:   Do not shower until first post-operative appointment  Dressing Instructions:   Keep dressing clean, dry and intact until follow up appointment  Driving Instructions:  No driving until cleared by Orthopaedic Surgery  PT/OT:  Continue PT/OT on outpatient basis as directed    Appt Instructions: If you do not have your appointment, please call the clinic at [phone]  Otherwise followup as scheduled    Contact the office sooner if you experience any increased numbness/tingling in the extremities        Miscellaneous:  None

## 2023-05-01 NOTE — ANESTHESIA PROCEDURE NOTES
Peripheral Block    Patient location during procedure: holding area  Start time: 5/1/2023 7:05 AM  Reason for block: at surgeon's request and post-op pain management  Staffing  Performed: Anesthesiologist   Anesthesiologist: Brigitte Ramos MD  Preanesthetic Checklist  Completed: patient identified, IV checked, site marked, risks and benefits discussed, surgical consent, monitors and equipment checked, pre-op evaluation and timeout performed  Peripheral Block  Patient position: left lateral decubitus  Prep: ChloraPrep  Patient monitoring: continuous pulse ox, frequent blood pressure checks and heart rate  Block type: ipack block  Laterality: right  Injection technique: single-shot  Procedures: ultrasound guided, Ultrasound guidance required for the procedure to increase accuracy and safety of medication placement and decrease risk of complications    Ultrasound permanent image savedbupivacaine (MARCAINE) 0 5 % - Perineural   10 mL - 5/1/2023 7:10:00 AM  lidocaine (PF) (XYLOCAINE-MPF) 1 % - Infiltration   5 mL - 5/1/2023 7:05:00 AM  Needle  Needle type: Stimuplex   Needle gauge: 22 G  Needle length: 15 cm  Needle localization: anatomical landmarks and ultrasound guidance  Needle insertion depth: 11 cm  Assessment  Injection assessment: incremental injection, local visualized surrounding nerve on ultrasound, negative aspiration for heme and no paresthesia on injection  Paresthesia pain: none  Heart rate change: no  Slow fractionated injection: yes  Post-procedure:  site cleaned  patient tolerated the procedure well with no immediate complications

## 2023-05-01 NOTE — ANESTHESIA PREPROCEDURE EVALUATION
Procedure:  ARTHROPLASTY KNEE TOTAL W ROBOT (Right: Knee)    Relevant Problems   ENDO   (+) Hypothyroidism      /RENAL   (+) Stage 2 chronic kidney disease      MUSCULOSKELETAL   (+) Primary osteoarthritis of right knee        Physical Exam    Airway    Mallampati score: I  TM Distance: >3 FB  Neck ROM: full     Dental   No notable dental hx     Cardiovascular      Pulmonary      Other Findings        Anesthesia Plan  ASA Score- 2     Anesthesia Type- spinal with ASA Monitors  Additional Monitors:   Airway Plan:           Plan Factors-Exercise tolerance (METS): >4 METS  Chart reviewed  EKG reviewed  Existing labs reviewed  Patient summary reviewed  Patient is not a current smoker  There is medical exclusion for perioperative obstructive sleep apnea risk education  Induction- intravenous  Postoperative Plan- Plan for postoperative opioid use  Informed Consent- Anesthetic plan and risks discussed with patient  I personally reviewed this patient with the CRNA  Discussed and agreed on the Anesthesia Plan with the CRNA  Ruy Nails

## 2023-05-01 NOTE — CONSULTS
Office Visit  2/21/2023  [address] [facility] [place]   Candance Hal, MD  Orthopedic Surgery  Right knee pain, unspecified chronicity +3 more  Dx  Right Knee - Pain; Referred by Juloi Shirley MD  Reason for Visit     Progress Notes  Candance Hal, MD (Physician) [facility] Orthopedic Surgery  Expand All Collapse All  Assessment:  1  Right knee pain, unspecified chronicity  XR knee 3 vw right non injury       2  Primary osteoarthritis of right knee          3  Encounter for other specified surgical aftercare          4  Encounter for other orthopedic aftercare                Plan:  The patient will be scheduled for right total knee arthroplasty  We feel the patient will find significant relief per current symptoms, findings on imaging and exam   Risks, benefits, precautions and expectations were discussed  Risks include blood loss, potential infection and blood clots  Preoperative vitamins were prescribed  The patient should follow up after surgery           To do next visit:  Return for post-op with x-rays      The above stated was discussed in layman's terms and the patient expressed understanding  All questions were answered to the patient's satisfaction               Scribe Attestation    I,:  Paul Velasquez am acting as a scribe while in the presence of the attending physician :       I,:  Candance Hal, MD personally performed the services described in this documentation    as scribed in my presence  :                 Subjective:   Srinivas Bernard is a 70 y o  female who presents for initial evaluation of right knee with history of 2x surgery over past 5 years with Stefano Gupta  She has longstanding history of right knee issues  Today she complains of right generalized knee pain, instability and locking  Walking, transitional positions aggravate while rest alleviates  She does use Advil occasionally  She does use compression sleeve with some benefit    She has had multiple steroid and visco injections with limited benefit  She has done physical therapy with limited benefit  Again, she has had arthroscopy and subchondroplasty with Dr Miller over past 5 years  She has history of left TKA 10 years ago, Dr Miller           Review of systems negative unless otherwise specified in HPI     Medical History   History reviewed  No pertinent past medical history         Surgical History         Past Surgical History:   Procedure Laterality Date    COLONOSCOPY   2017     normal            Family History         Family History   Problem Relation Age of Onset    Prostate cancer Father           62s or 76s    Breast cancer Maternal Grandmother 48    Breast cancer Paternal Grandmother 56         in her 62s   Hanson Murtaza No Known Problems Mother      Lymphoma Sister      No Known Problems Daughter      No Known Problems Maternal Grandfather      No Known Problems Paternal Grandfather      No Known Problems Sister      No Known Problems Sister      No Known Problems Paternal Aunt              Social History            Occupational History    Not on file   Tobacco Use    Smoking status: Never    Smokeless tobacco: Never   Vaping Use    Vaping Use: Never used   Substance and Sexual Activity    Alcohol use:  Yes    Drug use: Never    Sexual activity: Yes       Partners: Male            Current Outpatient Medications:     cholecalciferol (VITAMIN D3) 25 mcg (1,000 units) tablet, , Disp: , Rfl:     levothyroxine 100 mcg tablet, Take 100 mcg by mouth daily, Disp: , Rfl:     levothyroxine 75 mcg tablet, Take 75 mcg by mouth daily, Disp: , Rfl:     multivitamin (THERAGRAN) TABS, Take 1 tablet by mouth daily, Disp: , Rfl:     omeprazole (PriLOSEC) 20 mg delayed release capsule, Take 20 mg by mouth daily, Disp: , Rfl:     omeprazole (PriLOSEC) 40 MG capsule, Take 40 mg by mouth daily, Disp: , Rfl:     Rhubarb (ESTROVEN COMPLETE PO), Take by mouth, Disp: , Rfl:           Allergies "  Allergen Reactions    Morphine Nausea Only    Sulfa Antibiotics Hives    Sulfacetamide Sodium-Sulfur Rash               Vitals:     02/21/23 1023   BP: 152/84   Pulse: 90         Objective:  Physical exam   General: Awake, Alert, Oriented   Eyes: Pupils equal, round and reactive to light   Heart: regular rate and rhythm   Lungs: No audible wheezing   Abdomen: soft                     Ortho Exam  Right knee:  Varus alignment  TTP over medial joint line  Bony enlargement over superior patella  No erythema or ecchymosis  No effusion or swelling  Normal strength  Good ROM with crepitus   Calf compartments soft and supple  Sensation intact  Toes are warm sensate and mobile        Diagnostics, reviewed and taken today if performed as documented:     The attending physician has personally reviewed the pertinent films in PACS and interpretation is as follows:  Right knee x-ray:  Severe bone on bone patellofemoral arthritic changes with moderate-severe medial and lateral compartment arthritic changes        Procedures, if performed today:     Procedures     None performed       Portions of the record may have been created with voice recognition software   Occasional wrong word or \"sound a like\" substitutions may have occurred due to the inherent limitations of voice recognition software   Read the chart carefully and recognize, using context, where substitutions have occurred            Instructions       Return for post-op with x-rays  After Visit Summary (Printed 2/21/2023)  Additional Documentation    Vitals:   /84   Pulse 90   Ht 5' 3\" (1 6 m)   BMI 30 11 kg/m²   BSA 1 8 m²      More Vitals   SmartForms:    [facility] PRE-CHARTING [facility]/PCSP WRAP UP REQUIREMENTS ADVANCED     [facility] SCRIBE ATTESTATION      (2 more)   Encounter Info:   Billing Info,   History,   Allergies,   Detailed Report        Communications    View Encounter Conversation Summary  Orders Placed       Anemia Panel " w/Reflex     APTT     CBC and differential     Comprehensive metabolic panel     Hemoglobin A1C W/EAG Estimation     Protime-INR     Type and screen     XR knee 3 vw right non injury     Ambulatory referral to Physical Therapy Pending Review     Case request operating room: ARTHROPLASTY KNEE TOTAL W ROBOT Once  Medication Changes       Ascorbic Acid 500 mg Oral Daily, Start 30 days prior to surgery     Enoxaparin Sodium 40 mg Subcutaneous Daily, Start injections after surgery     Ferrous Sulfate 324 mg Oral 2 times daily before meals, Start 30 days prior to surgery     Folic Acid 1 mg Oral Daily, Start 30 days prior to surgery     Medication List     Visit Diagnoses       Right knee pain, unspecified chronicity     Primary osteoarthritis of right knee     Encounter for other specified surgical aftercare     Encounter for other orthopedic aftercare     Problem List     Encounters with Related Results     Hospital Encounter (2/21/2023)

## 2023-05-01 NOTE — PROGRESS NOTES
Progress Note - Orthopedics   Janet Leventhal 70 y o  female MRN: [id number]  Unit/Bed#: Operating Room      Subjective:    70 y  o female POD 0 from R TKA  No acute events, no new complaints  Patient doing well  Pain well controlled  Labs:  0   Lab Value Date/Time    HCT 43 4 04/11/2023 0857    HCT 45 8 05/24/2022 0854    HCT 47 5 (H) 02/24/2021 0908    HGB 14 7 04/11/2023 0857    HGB 15 1 05/24/2022 0854    HGB 15 3 02/24/2021 0908    INR 0 9 04/11/2023 0857    WBC 5 8 04/11/2023 0857    WBC 6 56 05/24/2022 0854    WBC 5 97 02/24/2021 0908       Meds:    Current Facility-Administered Medications:     bupivacaine liposomal (EXPAREL) 1 3 % injection 20 mL, 20 mL, Infiltration, Once, Kristel Lockhart PA-C    fentaNYL (SUBLIMAZE) injection 50 mcg, 50 mcg, Intravenous, Q3 min PRN, Liang Ca MD    HYDROmorphone (DILAUDID) injection 0 5 mg, 0 5 mg, Intravenous, Q5 Min PRN, Liang Ca MD    lactated ringers infusion, 125 mL/hr, Intravenous, Continuous, Julia Malave PA-C, [place] at 05/01/23 5429    lactated ringers infusion, 125 mL/hr, Intravenous, Continuous, Kristel Lockhart PA-C    metoclopramide (REGLAN) injection 10 mg, 10 mg, Intravenous, Once PRN, Liang Ca MD    povidone-iodine (BETADINE) 10 % 20 application   in sodium chloride 0 9 % 500 mL irrigation bottle, , Irrigation, Once, Dixie Zavala MD    povidone-iodine (BETADINE) 10 % 20 mL in sodium chloride 0 9 % 500 mL irrigation bottle, , , PRN, Dixie Zavala MD, 520 mL at 05/01/23 0838    sodium chloride 0 9 % irrigation solution, , , PRN, Dixie Zavala MD, 1,000 mL at 05/01/23 0937    Facility-Administered Medications Ordered in Other Encounters:     dexamethasone (PF) (DECADRON) injection, , Intravenous, PRN, Jacinto Carrasco CRNA, 10 mg at 05/01/23 0750    ePHEDrine injection, , Intravenous, PRN, Jacinto Poke, CRNA, 5 mg at 05/01/23 0845    fentanyl citrate (PF) 100 MCG/2ML, , Intravenous, PRN, Francisco Gens, CRNA, 50 mcg at 05/01/23 0854    midazolam (VERSED) injection, , Intravenous, PRN, Francisco Gens, CRNA, 2 mg at 05/01/23 0710    ondansetron (ZOFRAN) injection, , Intravenous, PRN, Francisco Gens, CRNA, 4 mg at 05/01/23 0751    phenylephrine 1,000 mcg/10 mL prefilled syringe, , Intravenous, PRN, Francisco Gens, CRNA, 50 mcg at 05/01/23 0845    propofol (DIPRIVAN) 1000 mg in 100 mL infusion (premix), , Intravenous, Continuous PRN, Francisco Gens, CRNA, Stopped at 05/01/23 2913    Blood Culture:   No results found for: BLOODCX    Wound Culture:   No results found for: WOUNDCULT    Ins and Outs:  I/O last 24 hours: In: 1600 [I V :1500; IV Piggyback:100]  Out: 300 [Urine:250; Blood:50]          Physical:  Vitals:    05/01/23 0614   BP: 167/94   Pulse: 84   Resp: 16   Temp: 98 9 °F (37 2 °C)   SpO2: 100%     Musculoskeletal: right Lower Extremity  · Skin intact surrounding dressing  No erythema or ecchymosis  · Dressing C/D/I - soft with cool pad  · NTTP  · Sensation intact to saphenous, sural, tibial, superficial peroneal nerve, and deep peroneal  · Motor intact to +FHL/EHL, +ankle dorsi/plantar flexion  · Digits warm and well perfused  · Capillary refill < 2 seconds    Assessment:    70 y  o female POD 0 R TKA  Patient doing well       Plan:  · WBAT RLE  · Ancef x 2  · Will monitor for ABLA and administer IVF/prbc as indicated for Greater than 2 gram drop or Hgb < 7  · PT/OT  · Pain control  · DVT ppx lov x 28 days  · Medicine consult for co management appreciated  · APS consult for block follow up  · Dispo: Ortho will follow    Albert London MD

## 2023-05-01 NOTE — INTERVAL H&P NOTE
H&P reviewed  After examining the patient I find no changes in the patients condition since the H&P had been written  Vitals:    05/01/23 0614   BP: 167/94   Pulse: 84   Resp: 16   Temp: 98 9 °F (37 2 °C)   SpO2: 100%   Head: Present  CVS: RRR  Lungs: Clear bilateral  Abdomen: Present  Assessment: Adult female osteoarthritis of the right knee who has persistence of pain and dysfunction despite appropriate nonsurgical treatments  Plan: Right total knee arthroplasty    Patient is familiar with the risks and benefits

## 2023-05-01 NOTE — H&P (VIEW-ONLY)
Office Visit  2/21/2023  [address] [facility] Farhan Fisher MD  Orthopedic Surgery  Right knee pain, unspecified chronicity +3 more  Dx  Right Knee - Pain; Referred by Corazon Duggan MD  Reason for Visit     Progress Notes  Mao Fisher MD (Physician) [facility] Orthopedic Surgery  Expand All Collapse All  Assessment:  1  Right knee pain, unspecified chronicity  XR knee 3 vw right non injury       2  Primary osteoarthritis of right knee          3  Encounter for other specified surgical aftercare          4  Encounter for other orthopedic aftercare                Plan:  The patient will be scheduled for right total knee arthroplasty  We feel the patient will find significant relief per current symptoms, findings on imaging and exam   Risks, benefits, precautions and expectations were discussed  Risks include blood loss, potential infection and blood clots  Preoperative vitamins were prescribed  The patient should follow up after surgery           To do next visit:  Return for post-op with x-rays      The above stated was discussed in layman's terms and the patient expressed understanding  All questions were answered to the patient's satisfaction               Scribe Attestation    I,:  Yong Gottron am acting as a scribe while in the presence of the attending physician :       I,:  Mao Fisher MD personally performed the services described in this documentation    as scribed in my presence  :                 Subjective:   Arsen Zepeda is a 70 y o  female who presents for initial evaluation of right knee with history of 2x surgery over past 5 years with Yulisa Mitchell  She has longstanding history of right knee issues  Today she complains of right generalized knee pain, instability and locking  Walking, transitional positions aggravate while rest alleviates  She does use Advil occasionally  She does use compression sleeve with some benefit    She has had multiple steroid and visco injections with limited benefit  She has done physical therapy with limited benefit  Again, she has had arthroscopy and subchondroplasty with Dr Miller over past 5 years  She has history of left TKA 10 years ago, Dr Miller           Review of systems negative unless otherwise specified in HPI     Medical History   History reviewed  No pertinent past medical history         Surgical History         Past Surgical History:   Procedure Laterality Date    COLONOSCOPY   2017     normal            Family History         Family History   Problem Relation Age of Onset    Prostate cancer Father           62s or 76s    Breast cancer Maternal Grandmother 48    Breast cancer Paternal Grandmother 56         in her 62s   Natalie Gonzalez No Known Problems Mother      Lymphoma Sister      No Known Problems Daughter      No Known Problems Maternal Grandfather      No Known Problems Paternal Grandfather      No Known Problems Sister      No Known Problems Sister      No Known Problems Paternal Aunt              Social History            Occupational History    Not on file   Tobacco Use    Smoking status: Never    Smokeless tobacco: Never   Vaping Use    Vaping Use: Never used   Substance and Sexual Activity    Alcohol use:  Yes    Drug use: Never    Sexual activity: Yes       Partners: Male            Current Outpatient Medications:     cholecalciferol (VITAMIN D3) 25 mcg (1,000 units) tablet, , Disp: , Rfl:     levothyroxine 100 mcg tablet, Take 100 mcg by mouth daily, Disp: , Rfl:     levothyroxine 75 mcg tablet, Take 75 mcg by mouth daily, Disp: , Rfl:     multivitamin (THERAGRAN) TABS, Take 1 tablet by mouth daily, Disp: , Rfl:     omeprazole (PriLOSEC) 20 mg delayed release capsule, Take 20 mg by mouth daily, Disp: , Rfl:     omeprazole (PriLOSEC) 40 MG capsule, Take 40 mg by mouth daily, Disp: , Rfl:     Rhubarb (ESTROVEN COMPLETE PO), Take by mouth, Disp: , Rfl:           Allergies "  Allergen Reactions    Morphine Nausea Only    Sulfa Antibiotics Hives    Sulfacetamide Sodium-Sulfur Rash               Vitals:     02/21/23 1023   BP: 152/84   Pulse: 90         Objective:  Physical exam   General: Awake, Alert, Oriented   Eyes: Pupils equal, round and reactive to light   Heart: regular rate and rhythm   Lungs: No audible wheezing   Abdomen: soft                     Ortho Exam  Right knee:  Varus alignment  TTP over medial joint line  Bony enlargement over superior patella  No erythema or ecchymosis  No effusion or swelling  Normal strength  Good ROM with crepitus   Calf compartments soft and supple  Sensation intact  Toes are warm sensate and mobile        Diagnostics, reviewed and taken today if performed as documented:     The attending physician has personally reviewed the pertinent films in PACS and interpretation is as follows:  Right knee x-ray:  Severe bone on bone patellofemoral arthritic changes with moderate-severe medial and lateral compartment arthritic changes        Procedures, if performed today:     Procedures     None performed       Portions of the record may have been created with voice recognition software   Occasional wrong word or \"sound a like\" substitutions may have occurred due to the inherent limitations of voice recognition software   Read the chart carefully and recognize, using context, where substitutions have occurred            Instructions       Return for post-op with x-rays  After Visit Summary (Printed 2/21/2023)  Additional Documentation    Vitals:   /84   Pulse 90   Ht 5' 3\" (1 6 m)   BMI 30 11 kg/m²   BSA 1 8 m²      More Vitals   SmartForms:    [facility] PRE-[facility]ART[facility]/PCSP WRAP UP REQUIREMENTS ADVANCED     [facility] SCRIBE ATTESTATION      (2 more)   Encounter Info:   Billing Info,   History,   Allergies,   Detailed Report        Communications    View Encounter Conversation Summary  Orders Placed       Anemia Panel " w/Reflex     APTT     CBC and differential     Comprehensive metabolic panel     Hemoglobin A1C W/EAG Estimation     Protime-INR     Type and screen     XR knee 3 vw right non injury     Ambulatory referral to Physical Therapy Pending Review     Case request operating room: ARTHROPLASTY KNEE TOTAL W ROBOT Once  Medication Changes       Ascorbic Acid 500 mg Oral Daily, Start 30 days prior to surgery     Enoxaparin Sodium 40 mg Subcutaneous Daily, Start injections after surgery     Ferrous Sulfate 324 mg Oral 2 times daily before meals, Start 30 days prior to surgery     Folic Acid 1 mg Oral Daily, Start 30 days prior to surgery     Medication List     Visit Diagnoses       Right knee pain, unspecified chronicity     Primary osteoarthritis of right knee     Encounter for other specified surgical aftercare     Encounter for other orthopedic aftercare     Problem List     Encounters with Related Results     Hospital Encounter (2/21/2023)

## 2023-05-02 VITALS
RESPIRATION RATE: 17 BRPM | OXYGEN SATURATION: 97 % | HEART RATE: 72 BPM | TEMPERATURE: 98.6 F | SYSTOLIC BLOOD PRESSURE: 105 MMHG | DIASTOLIC BLOOD PRESSURE: 60 MMHG | HEIGHT: 63 IN | WEIGHT: 165 LBS | BODY MASS INDEX: 29.23 KG/M2

## 2023-05-02 LAB
ANION GAP SERPL CALCULATED.3IONS-SCNC: 4 MMOL/L (ref 4–13)
BUN SERPL-MCNC: 14 MG/DL (ref 5–25)
CALCIUM SERPL-MCNC: 9.1 MG/DL (ref 8.3–10.1)
CHLORIDE SERPL-SCNC: 106 MMOL/L (ref 96–108)
CO2 SERPL-SCNC: 28 MMOL/L (ref 21–32)
CREAT SERPL-MCNC: 0.94 MG/DL (ref 0.6–1.3)
ERYTHROCYTE [DISTWIDTH] IN BLOOD BY AUTOMATED COUNT: 13.1 % (ref 11.6–15.1)
GFR SERPL CREATININE-BSD FRML MDRD: 63 ML/MIN/1.73SQ M
GLUCOSE P FAST SERPL-MCNC: 119 MG/DL (ref 65–99)
GLUCOSE SERPL-MCNC: 119 MG/DL (ref 65–140)
HCT VFR BLD AUTO: 37.1 % (ref 34.8–46.1)
HGB BLD-MCNC: 11.9 G/DL (ref 11.5–15.4)
MCH RBC QN AUTO: 29.8 PG (ref 26.8–34.3)
MCHC RBC AUTO-ENTMCNC: 32.1 G/DL (ref 31.4–37.4)
MCV RBC AUTO: 93 FL (ref 82–98)
PLATELET # BLD AUTO: 214 THOUSANDS/UL (ref 149–390)
PMV BLD AUTO: 10.1 FL (ref 8.9–12.7)
POTASSIUM SERPL-SCNC: 4 MMOL/L (ref 3.5–5.3)
RBC # BLD AUTO: 3.99 MILLION/UL (ref 3.81–5.12)
SODIUM SERPL-SCNC: 138 MMOL/L (ref 135–147)
WBC # BLD AUTO: 15.8 THOUSAND/UL (ref 4.31–10.16)

## 2023-05-02 RX ADMIN — ACETAMINOPHEN 975 MG: 325 TABLET ORAL at 08:57

## 2023-05-02 RX ADMIN — OXYCODONE HYDROCHLORIDE 10 MG: 5 TABLET ORAL at 09:43

## 2023-05-02 RX ADMIN — PANTOPRAZOLE SODIUM 40 MG: 40 TABLET, DELAYED RELEASE ORAL at 05:26

## 2023-05-02 RX ADMIN — Medication 1 TABLET: at 08:57

## 2023-05-02 RX ADMIN — LEVOTHYROXINE SODIUM 75 MCG: 75 TABLET ORAL at 05:26

## 2023-05-02 RX ADMIN — SENNOSIDES 8.6 MG: 8.6 TABLET, FILM COATED ORAL at 08:57

## 2023-05-02 RX ADMIN — OXYCODONE HYDROCHLORIDE 10 MG: 5 TABLET ORAL at 05:25

## 2023-05-02 NOTE — DISCHARGE SUMMARY
ORTHOPEDICS DISCHARGE SUMMARY  Brielle Jolley 70 y o  female MRN: [id number]  Unit/Bed#: -01    Attending Physician: Darya Padgett    Admitting diagnosis: Primary osteoarthritis of right knee [M17 11]  Right knee pain, unspecified chronicity [M25 561]    Discharge diagnosis: Primary osteoarthritis of right knee [M17 11]  Right knee pain, unspecified chronicity [M25 561]    Date of admission: 5/1/2023    Date of discharge: 05/02/23         Procedure: ARTHROPLASTY KNEE TOTAL W ROBOT - Right     HPI:  This is a 70y o  year old female that presented to the office with signs and symptoms of right knee osteoarthritis  They tried and failed conservative treatment measures and wished to proceed with surgical intervention  The risks, benefits, and complications of the procedure were discussed with the patient and informed consent was obtained  Hospital Course: The patient was admitted to the hospital on 5/1/2023 and underwent an uncomplicated right total knee arthroplasty  They were transferred to the floor after a brief stay in the post-anesthesia care unit  Their pain was well managed with IV and oral pain medications  They began therapy on post operative day #1  Lovenox 40 mg daily was also started for DVT prophylaxis 12 hours post operatively  Hemovac drain was removed on POD1  On discharge date pt was cleared by PT and the medicine team and determined to be safe for discharge  Daily discussion was had with the patient, nursing staff, orthopaedic team, and family members if present  All questions were answered to the patients satisifaction  0   Lab Value Date/Time    HGB 11 9 05/02/2023 0502    HGB 14 7 04/11/2023 0857    HGB 15 1 05/24/2022 0854    HGB 15 3 02/24/2021 0908       Greater than 2 gram drop which qualifies for diagnosis of acute blood loss anemia  Vital signs remained stable and pt was resuscitated with IVF as needed   Body mass index is 29 23 kg/m²  mildly obese   Recommend behavior modifications, nutrition and physical activity  Discharge Instructions: The patient was discharged weight bearing as tolerated to the right lower extremity  Lovenox 40 mg daily will be continued for 28 days  Continue PT/OT  Take pain medications as instructed  Discharge Medications: For the complete list of discharge medications, please refer to the patient's medication reconciliation

## 2023-05-02 NOTE — PROGRESS NOTES
Progress Note - Orthopedics   Cesar Simmons 70 y o  female MRN: [id number]  Unit/Bed#: -01      Subjective    70 y  o female  No acute events, no complaints  Pt doing well  Pain controlled   Denies fevers chills, CP, SOB    Labs:  0   Lab Value Date/Time    HCT 43 4 04/11/2023 0857    HCT 45 8 05/24/2022 0854    HCT 47 5 (H) 02/24/2021 0908    HGB 14 7 04/11/2023 0857    HGB 15 1 05/24/2022 0854    HGB 15 3 02/24/2021 0908    INR 0 9 04/11/2023 0857    WBC 5 8 04/11/2023 0857    WBC 6 56 05/24/2022 0854    WBC 5 97 02/24/2021 0908       Meds:    Current Facility-Administered Medications:     acetaminophen (TYLENOL) tablet 975 mg, 975 mg, Oral, Q8H, Kristel Lockhart PA-C, 975 mg at 05/01/23 2351    calcium carbonate (TUMS) chewable tablet 1,000 mg, 1,000 mg, Oral, Daily PRN, Sandra Hill PA-C    enoxaparin (LOVENOX) subcutaneous injection 40 mg, 40 mg, Subcutaneous, Daily, Kristel Lockhart PA-C, 40 mg at 05/01/23 2019    gabapentin (NEURONTIN) capsule 300 mg, 300 mg, Oral, HS, Kristel Lockhart PA-C, 300 mg at 05/01/23 2123    hydrALAZINE (APRESOLINE) tablet 25 mg, 25 mg, Oral, Q8H PRN, Pavithra Moeller PA-C    HYDROmorphone (DILAUDID) injection 0 5 mg, 0 5 mg, Intravenous, Q2H PRN, Sandra Hill PA-C, 0 5 mg at 05/01/23 1439    lactated ringers bolus 1,000 mL, 1,000 mL, Intravenous, Once PRN **AND** lactated ringers bolus 1,000 mL, 1,000 mL, Intravenous, Once PRN, Sandra Hill PA-C    lactated ringers infusion, 125 mL/hr, Intravenous, Continuous, Mary Danielle PA-C, Stopped at 05/02/23 2602    levothyroxine tablet 75 mcg, 75 mcg, Oral, Early Morning, Kristel Lockhart PA-C, 75 mcg at 05/02/23 0526    methocarbamol (ROBAXIN) tablet 500 mg, 500 mg, Oral, Q6H PRN, Pavithra Moeller PA-C    multivitamin-minerals (CENTRUM) tablet 1 tablet, 1 tablet, Oral, Daily, Kristel Lockhart PA-C, 1 tablet at 05/01/23 1006    ondansetron (ZOFRAN) injection 4 mg, 4 mg, Intravenous, Q6H PRN, Tyree Sierra PA-C, 4 mg at 05/01/23 1504    oxyCODONE (ROXICODONE) IR tablet 10 mg, 10 mg, Oral, Q4H PRN, Kristel Lockhart PA-C, 10 mg at 05/02/23 0525    oxyCODONE (ROXICODONE) IR tablet 5 mg, 5 mg, Oral, Q4H PRN, Tyree Sierra PA-C    pantoprazole (PROTONIX) EC tablet 40 mg, 40 mg, Oral, Early Morning, Kristel Lockhart PA-C, 40 mg at 05/02/23 0526    senna (SENOKOT) tablet 8 6 mg, 1 tablet, Oral, Daily, Kristel Lockhart PA-C 8 6 mg at 05/01/23 1006    sodium chloride 0 9 % bolus 1,000 mL, 1,000 mL, Intravenous, Once PRN **AND** sodium chloride 0 9 % bolus 1,000 mL, 1,000 mL, Intravenous, Once PRN, Kristel Lockhart PA-C    Blood Culture:   No results found for: BLOODCX    Wound Culture:   No results found for: WOUNDCULT    Ins and Outs:  I/O last 24 hours: In: 1125 [P O :2000; I V :2656; IV Piggyback:100]  Out: 7353 [Urine:3600; Emesis/NG output:325; Drains:430; Blood:50]          Physical:  Vitals:    05/02/23 0305   BP: 116/60   Pulse: 85   Resp: 16   Temp: 97 7 °F (36 5 °C)   SpO2: 95%     Musculoskeletal: right Lower Extremity  · Skin intact around dressing  · Dressing clean dry and intact  Drain in place with 330cc total output   · TTP about knee appropriately   · SILT s/s/sp/dp/t  +fhl/ehl, +ankle dorsi/plantar flexion  2+ DP pulse    Assessment:    70 y  o female POD 1 R TKA doing well      Plan:  · WB AT RLE    · PT/OT  · Drain pull today   · Pain control  · DVT ppx in the form of lovenox   · Dispo: Ortho will follow     Vineet Cummings MD

## 2023-05-02 NOTE — OCCUPATIONAL THERAPY NOTE
"    Occupational Therapy Evaluation     Patient Name: Noah Andrew  INIFP'D Date: 5/2/2023  Problem List  Principal Problem:    Primary osteoarthritis of right knee  Active Problems:    S/P total knee arthroplasty, right    Past Medical History  Past Medical History:   Diagnosis Date    Disease of thyroid gland     GERD (gastroesophageal reflux disease)      Past Surgical History  Past Surgical History:   Procedure Laterality Date    ANTERIOR CRUCIATE LIGAMENT REPAIR Bilateral     COLONOSCOPY  2017    normal    JOINT REPLACEMENT               05/02/23 0950   OT Last Visit   OT Visit Date 05/02/23   Note Type   Note type Evaluation   Pain Assessment   Pain Score No Pain   Restrictions/Precautions   Weight Bearing Precautions Per Order Yes   RLE Weight Bearing Per Order WBAT   Other Precautions WBS; Multiple lines   Home Living   Type of [address] [place] [address] Two level; Able to live on main level with bedroom/bathroom;Stairs to enter with rails   Bathroom Shower/Tub Walk-in shower   Bathroom Toilet Standard   Bathroom Equipment Built-in shower seat;Hand-held shower;Grab bars in [employer] [address] Walker;Cane   Additional Comments Pt lives in a 2 level home with 2 ANGIE and FFOS to second floor however reports can stay on main level wit hfull bath alailable  Has above DME but was not using PTA   Prior Function   Level of [place] Independent with ADLs; Independent with functional mobility; Independent with IADLS   Lives With Spouse   Receives Help From Family   IADLs Independent with driving; Independent with meal prep; Independent with medication management   Falls in the last 6 months 0   Vocational Retired   Lifestyle   Autonomy I with ADLS and IADLS +    Reciprocal Relationships supportive husabnd   Service to Others retired   Intrinsic Gratification Enjoys watching sports and reading mysteries   Subjective   Subjective \"I'm ready to go home\"   ADL   Where Assessed " Chair   Eating Assistance 6  Modified independent   Grooming Assistance 6  Modified Independent   UB Bathing Assistance 6  Modified Independent   LB Bathing Assistance 6  Modified Independent   UB Dressing Assistance 6  Modified independent   LB Dressing Assistance 6  Modified independent   Toileting Assistance  6  Modified independent   Bed Mobility   Additional Comments OOB upon presentation and at end of session   Transfers   Sit to Stand 6  Modified independent   Stand to Sit 6  Modified independent   Stand pivot 6  Modified independent   Additional Comments RW   Functional Mobility   Functional Mobility 6  Modified independent   Additional Comments RW   Balance   Static Sitting Good   Dynamic Sitting Fair +   Static Standing Fair +   Dynamic Standing Fair   Ambulatory Fair   Activity Tolerance   Activity Tolerance Patient tolerated treatment well   Medical Staff Made Aware PTA, NSG aware   Nurse Made Aware yes   RUE Assessment   RUE Assessment WFL   LUE Assessment   LUE Assessment WFL   Psychosocial   Psychosocial (WDL) WDL   Cognition   Overall Cognitive Status WFL   Arousal/Participation Alert; Responsive; Cooperative   Attention Within functional limits   Orientation Level Oriented X4   Memory Within functional limits   Following Commands Follows all commands and directions without difficulty   Comments Pleasant and cooeprative, understanding of all education provided   Assessment   Assessment Patient is a 70 y o  female admitted to [facility] on 5/1/2023 due to Primary osteoarthritis of right knee  Pt is now s/p R TKA  Pt performed at mod I level with no OT needs identified at this time  Comorbidities affecting pt's physical performance at time of assessment include Disease of thyroid gland and GERD (gastroesophageal reflux disease)  The patient's raw score on the AM-PAC Daily Activity inpatient short form is 24  , standardized score is 57 54  , greater than 39 4   Patients at this level are likely to benefit from DC to home  From OT standpoint recommend home with family support upon D/C  No further acute OT needs identified at this time  Recommend continued mobilization with hospital staff and restorative services while in the hospital to increase pts endurance and strength upon D/C  From OT standpoint, recommend D/C to home with family support when medically cleared  D/C pt from OT caseload at this time     Goals   Patient Goals To go home   Plan   OT Frequency Eval only   Recommendation   OT Discharge Recommendation No rehabilitation needs   AM-PAC Daily Activity Inpatient   Lower Body Dressing 4   Bathing 4   Toileting 4   Upper Body Dressing 4   Grooming 4   Eating 4   Daily Activity Raw Score 24   Daily Activity Standardized Score (Calc for Raw Score >=11) 57 54   AM-PAC Applied Cognition Inpatient   Following a Speech/Presentation 4   Understanding Ordinary Conversation 4   Taking Medications 4   Remembering Where Things Are Placed or Put Away 4   Remembering List of 4-5 Errands 4   Taking Care of Complicated Tasks 4   Applied Cognition Raw Score 24   Applied Cognition Standardized Score 62 21

## 2023-05-02 NOTE — PROGRESS NOTES
Internal Medicine Progress Note  Patient: Roque Jaramillo  Age/sex: 70 y o  female  Medical Record #: [id number]      ASSESSMENT/PLAN: (Interval History)  Roque Jaramillo is seen and examined and management for following issues:    Status Post right Total KNEE ARTHROPLASTY   Pain controlled   Continue encourage incentive spirometry; monitor fever curve   DVT prophylaxis in place and reviewed  Results from last 7 days   Lab Units 05/02/23  0502   WBC Thousand/uL 15 80*   HEMOGLOBIN g/dL 11 9   HEMATOCRIT % 37 1   PLATELETS Thousands/uL 214         Hypothyroidism   Continue levothyroxine      GERD   Continue PPI   Monitor for nausea/vomiting post-op        OK for dc from medicine standpoint  PRE-OP HGB LEVEL: 14 7  The above assessment and plan was reviewed and updated as determined by my evaluation of the patient on 5/2/2023      Labs:   Results from last 7 days   Lab Units 05/02/23  0502   WBC Thousand/uL 15 80*   HEMOGLOBIN g/dL 11 9   HEMATOCRIT % 37 1   PLATELETS Thousands/uL 214     Results from last 7 days   Lab Units 05/02/23  0502   SODIUM mmol/L 138   POTASSIUM mmol/L 4 0   CHLORIDE mmol/L 106   CO2 mmol/L 28   BUN mg/dL 14   CREATININE mg/dL 0 94   CALCIUM mg/dL 9 1             Results from last 7 days   Lab Units 05/01/23  0910   POC GLUCOSE mg/dl 106       Review of Scheduled Meds:  Current Facility-Administered Medications   Medication Dose Route Frequency Provider Last Rate    acetaminophen  975 mg Oral Q8H Kristel Lockhart PA-C      calcium carbonate  1,000 mg Oral Daily PRN Ena Braun PA-C      enoxaparin  40 mg Subcutaneous Daily Kristel Lockhart PA-C      gabapentin  300 mg Oral HS Kristel Lockhart PA-C      hydrALAZINE  25 mg Oral Q8H PRN Pavithra Moeller PA-C      HYDROmorphone  0 5 mg Intravenous Q2H PRN Ena Braun PA-C      lactated ringers  1,000 mL Intravenous Once PRN Ena Braun PA-C      And    lactated ringers  1,000 mL Intravenous Once PRN Nas Zurita PA-C      lactated ringers  125 mL/hr Intravenous Continuous Bonita Perez PA-C Stopped (05/02/23 0520)    levothyroxine  75 mcg Oral Early Morning Nas Zurita PA-C      methocarbamol  500 mg Oral Q6H PRN Sanna Murphy PA-C      multivitamin-minerals  1 tablet Oral Daily Kristel Lockhart PA-C      ondansetron  4 mg Intravenous Q6H PRN Nas Zurita PA-C      oxyCODONE  10 mg Oral Q4H PRN Nas Zurita PA-C      oxyCODONE  5 mg Oral Q4H PRN Nas Zurita PA-C      pantoprazole  40 mg Oral Early Morning Nas Zurita PA-C      senna  1 tablet Oral Daily Kristel Lockhart PA-C      sodium chloride  1,000 mL Intravenous Once PRN Nas Zurita PA-C      And    sodium chloride  1,000 mL Intravenous Once PRN Nas Zurita PA-C         Subjective/ HPI: Patient seen and examined  Patients overnight issues or events were reviewed with nursing or staff during rounds or morning huddle session  New or overnight issues include the following:     Pt without any overnight events or reported nursing issues      ROS:   A 10 point ROS was performed; negative except as noted above         Imaging:     No orders to display       *Labs /Radiology studies Reviewed  *Medications  reviewed and reconciled as needed  *Please refer to order section for additional ordered labs studies  *Case discussed with primary attending during morning huddle case rounds    Physical Examination:  Vitals:   Vitals:    05/01/23 1937 05/01/23 2321 05/02/23 0305 05/02/23 0713   BP: 132/73 130/72 116/60 105/60   Pulse: 67 74 85 72   Resp: 22 15 16 17   Temp: (!) 96 6 °F (35 9 °C) (!) 97 3 °F (36 3 °C) 97 7 °F (36 5 °C) 98 6 °F (37 °C)   TempSrc:       SpO2: 95% 97% 95% 97%   Weight:       Height:           General Appearance: no distress, conversive  HEENT: PERRLA, conjuctiva normal; oropharynx clear; mucous membranes moist;   Neck:  Supple, no lymphadenopathy or thyromegaly  Lungs: Clear bilaterally no wheezes rales or rhonchi noted, normal respiratory effort, no retractions, expiratory effort normal  CV: regular rate and rhythm , PMI normal   ABD: soft non tender, +BS x4  EXT: DP pulses intact, no lymphadenopathy, no edema; right knee surgical dressing in place  Skin: normal turgor, normal texture, no rash  Psych: affect normal, mood normal  Neuro: AAOx3    : child removed ; due to void    The above physical exam was reviewed and updated as determined by my evaluation of the patient on 5/2/2023  Invasive Devices     Peripheral Intravenous Line  Duration           Peripheral IV 05/01/23 Distal;Left;Ventral (anterior) Forearm 1 day          Drain  Duration           Closed/Suction Drain Anterior;Right Knee Accordion 10 Fr  1 day                   VTE Pharmacologic Prophylaxis: Enoxaparin  Code Status: No Order  Current Length of Stay: 0 day(s)      Total floor / unit time spent today 30 minutes  Coordination of patient's care was performed in conjunction with primary service  Time invested included review of patient's labs, vitals, and management of their comorbidities with continued monitoring, examination of patient as well as answering patient questions, documenting her findings and creating progress note in electronic medical record,  ordering appropriate diagnostic testing  Medical decision making for the day was made by supervising physician unless otherwise noted in their attestation statement  ** Please Note:  voice to text software may have been used in the creation of this document   Although proof errors in transcription or interpretation are a potential of such software**

## 2023-05-02 NOTE — PLAN OF CARE
Problem: Prexisting or High Potential for Compromised Skin Integrity  Goal: Skin integrity is maintained or improved  Description: INTERVENTIONS:  - Identify patients at risk for skin breakdown  - Assess and monitor skin integrity  - Assess and monitor nutrition and hydration status  - Monitor labs   - Assess for incontinence   - Turn and reposition patient  - Assist with mobility/ambulation  - Relieve pressure over bony prominences  - Avoid friction and shearing  - Provide appropriate hygiene as needed including keeping skin clean and dry  - Evaluate need for skin moisturizer/barrier cream  - Collaborate with interdisciplinary team   - Patient/family teaching  - Consider wound care consult   Outcome: Adequate for Discharge     Problem: MOBILITY - ADULT  Goal: Maintain or return to baseline ADL function  Description: INTERVENTIONS:  -  Assess patient's ability to carry out ADLs; assess patient's baseline for ADL function and identify physical deficits which impact ability to perform ADLs (bathing, care of mouth/teeth, toileting, grooming, dressing, etc )  - Assess/evaluate cause of self-care deficits   - Assess range of motion  - Assess patient's mobility; develop plan if impaired  - Assess patient's need for assistive devices and provide as appropriate  - Encourage maximum independence but intervene and supervise when necessary  - Involve family in performance of ADLs  - Assess for home care needs following discharge   - Consider OT consult to assist with ADL evaluation and planning for discharge  - Provide patient education as appropriate  Outcome: Adequate for Discharge  Goal: Maintains/Returns to pre admission functional level  Description: INTERVENTIONS:  - Perform BMAT or MOVE assessment daily    - Set and communicate daily mobility goal to care team and patient/family/caregiver  - Collaborate with rehabilitation services on mobility goals if consulted  - Perform Range of Motion 3 times a day    - Reposition patient every 2 hours    - Dangle patient 3 times a day  - Stand patient 3 times a day  - Ambulate patient 3 times a day  - Out of bed to chair 3 times a day   - Out of bed for meals 3 times a day  - Out of bed for toileting  - Record patient progress and toleration of activity level   Outcome: Adequate for Discharge

## 2023-05-02 NOTE — PLAN OF CARE
Problem: PHYSICAL THERAPY ADULT  Goal: Performs mobility at highest level of function for planned discharge setting  See evaluation for individualized goals  Description: Treatment/Interventions: Functional transfer training, LE strengthening/ROM, Elevations, Therapeutic exercise, Endurance training, Patient/family training, Equipment eval/education, Bed mobility, Gait training, Compensatory technique education, Spoke to nursing, Family  Equipment Recommended:  (pt already has RW + SPC (RW adjusted to correct height))       See flowsheet documentation for full assessment, interventions and recommendations  Outcome: Adequate for Discharge  Note: Prognosis: Excellent  Problem List: Decreased strength, Decreased range of motion, Decreased endurance, Impaired balance, Decreased mobility, Pain  Assessment: This morning the patient continues to demonstrate improvement in her mobility  She was able to readily transfer and ambulate on level and stairs without difficulty  Educated her about shortening her stride to enhance her safety as she was taking extended strides at first  She manuevered around obstacles with ease, and anticipate that she will have an excellent recovery  Barriers to Discharge: None     PT Discharge Recommendation: Home with outpatient rehabilitation    See flowsheet documentation for full assessment

## 2023-05-02 NOTE — PHYSICAL THERAPY NOTE
Physical Therapy Progress Note     05/02/23 0850   PT Last Visit   PT Visit Date 05/02/23   Note Type   Note Type BID visit/treatment   Pain Assessment   Pain Assessment Tool 0-10   Pain Score 2   Pain Location/Orientation Orientation: Right;Location: Knee   Hospital Pain Intervention(s) Cold applied;Repositioned; Ambulation/increased activity   Restrictions/Precautions   Other Precautions Pain; Fall Risk   Subjective   Subjective The patient states that she feels well, and she notes that her pain is doing well  Transfers   Sit to Stand 6  Modified independent   Stand to Sit 6  Modified independent   Ambulation/Elevation   Gait pattern Excessively slow; Inconsistent esther   Gait Assistance 5  Supervision   Additional items Verbal cues  (Instruction for shortening her stride length )   Assistive Device Rolling walker   Distance 160 feet  Stair Management Assistance 5  Supervision   Additional items Verbal cues   Stair Management Technique Two rails; Step to pattern; Foreward;Nonreciprocal   Number of Stairs 5   Balance   Static Sitting Normal   Dynamic Sitting Good   Static Standing Fair +   Ambulatory Fair   Activity Tolerance   Activity Tolerance Patient tolerated treatment well   Nurse Mary Carmona RN  Exercises   Knee AROM Long Arc Quad Sitting;Bilateral;AROM;5 reps   Ankle Pumps Sitting;5 reps;AROM; Bilateral   Assessment   Prognosis Excellent   Problem List Decreased strength;Decreased range of motion;Decreased endurance; Impaired balance;Decreased mobility;Pain   Assessment This morning the patient continues to demonstrate improvement in her mobility  She was able to readily transfer and ambulate on level and stairs without difficulty  Educated her about shortening her stride to enhance her safety as she was taking extended strides at first  She manuevered around obstacles with ease, and anticipate that she will have an excellent recovery  Barriers to Discharge None   Goals   Patient Goals To go home  STG Expiration Date 05/11/23   PT Treatment Day 1   Plan   Treatment/Interventions Functional transfer training;LE strengthening/ROM; Elevations; Therapeutic exercise; Endurance training;Patient/family training;Bed mobility;Gait training   Progress Progressing toward goals   PT Frequency Twice a day   Recommendation   PT Discharge Recommendation Home with outpatient rehabilitation   AM-PAC Basic Mobility Inpatient   Turning in Flat Bed Without Bedrails 4   Lying on Back to Sitting on Edge of Flat Bed Without Bedrails 3   Moving Bed to Chair 3   Standing Up From Chair Using Arms 3   Walk in Room 3   Climb 3-5 Stairs With Railing 3   Basic Mobility Inpatient Raw Score 19   Basic Mobility Standardized Score 42 48   Highest Level Of Mobility   JH-HLM Goal 6: Walk 10 steps or more   JH-HLM Achieved 7: Walk 25 feet or more         An AM-PAC Basic Mobility raw score less than 16 suggests the patient may benefit from discharge to post-acute rehab services      Chino Felix, PTA

## 2023-05-03 ENCOUNTER — TELEPHONE (OUTPATIENT)
Dept: OBGYN CLINIC | Facility: HOSPITAL | Age: 63
End: 2023-05-03

## 2023-05-03 NOTE — TELEPHONE ENCOUNTER
Patient contacted for a postoperative follow up assessment  Patient reports 4-5/10 pain when sitting and 5-6/10 when walking with RW  Post-op PT scheduled for 5/4 at 10:15AM      Patient is taking Tylenol 1000mg every 8 hours, Oxycodone 5mg PRN, Lovenox injections, Colace 100mg BID  Patient has had a BM but is passing gas  Patient denies increase in swelling but dressing is clean, dry and intact  Patient is icing the site regularly  Patient reports vomiting yesterday, denies today  Denies nausea, vomiting, abdominal pain, chest pain, shortness of breath, fever, dizziness and calf pain   Patient confirmed post-op appointment with surgeon on 5/9 at 10:15AM  Patient does not have any other questions or concerns at this time

## 2023-05-03 NOTE — CASE MANAGEMENT
Case Management Discharge Planning Note    Patient name David Avitia  Location /-60 MRN [id number]  : 1960 Date 5/3/2023       Current Admission Date: 2023  Current Admission Diagnosis:Primary osteoarthritis of right knee   Patient Active Problem List    Diagnosis Date Noted    S/P total knee arthroplasty, right 2023    Stage 2 chronic kidney disease 2020    Elevated hemoglobin ([address] ) 2020    Primary osteoarthritis of right knee 2016    Need for prophylactic vaccination and inoculation against single disease 2015    BMI 30 0-30 9,adult 2015    Encounter for long-term (current) use of other medications 2015    Hypothyroidism 2013      LOS (days): 0  Geometric Mean LOS (GMLOS) (days):   Days to GMLOS:     OBJECTIVE:            Current admission status: Outpatient Surgery   Preferred Pharmacy:   [facility]/pharmacy #[id number]- [facility], [address] [place] [address],  [place] [address]  Phone: [phone] Fax: [phone]    Primary Care Provider: Zeyad Silva MD    Primary Insurance: BLUE CROSS  Secondary Insurance:     DISCHARGE DETAILS:    Discharge planning discussed with[de-identified] Pt at the bedside    [place] of Choice: Yes     CM contacted family/caregiver?: No- see comments (pt declined )  Were Treatment Team discharge recommendations reviewed with patient/caregiver?: Yes  Did patient/caregiver verbalize understanding of patient care needs?: Yes  Were patient/caregiver advised of the risks associated with not following Treatment Team discharge recommendations?: Yes    Contacts  Patient Contacts: Eder Santiago  Relationship to Patient[de-identified] Family  Contact Method: Phone  Phone Number: [phone]  Reason/Outcome: Emergency [address]         Is the patient interested in [facility] at discharge?: No    DME Referral Provided  Referral made for DME?: No    Other Referral/Resources/Interventions Provided:  Interventions: None Indicated  Referral Comments: Pt reports that she has a RW at home  No DME needs  PT referred to outpatient PT  Pt has appts scheduled           Treatment Team Recommendation: Home  Discharge Destination Plan[de-identified] Home  Transport at Discharge : [address]

## 2023-05-04 ENCOUNTER — OFFICE VISIT (OUTPATIENT)
Dept: PHYSICAL THERAPY | Facility: CLINIC | Age: 63
End: 2023-05-04

## 2023-05-04 DIAGNOSIS — M17.11 PRIMARY OSTEOARTHRITIS OF RIGHT KNEE: Primary | ICD-10-CM

## 2023-05-04 NOTE — PROGRESS NOTES
PT POST-OP EVALUATION     Today's date: 23  Patient name: Mihaela Dickey  : 1960  MRN: [id number]  Referring provider: Tong Lemos,*  Dx:   1  Primary osteoarthritis of right knee          Mihaela Dickey is a 70 y o  female who is recovering from right TKA on 23  She is doing well at home and able to get around the house including the second floor  Color, temp, swelling wnl  This patient would benefit from skilled physical therapy following their surgery to address their listed impairments and functional limitations to maximize functional outcome      Impairments:    restricted ROM    decreased strength   pain with function   activity intolerance   weight bearing intolerance      Prognosis:  Good  Positive and negative prognostic indicator(s):  pain >3 months     Goals:    STG Patient is independent with HEP   STG Range of motion is improved by 25% in 2 weeks  LTG Range of motion is improved by 50% in 4 weeks  LTG Increase strength full grade in 4 weeks  LTG ADL performance improved to prior level of function in 6 weeks  LTG Weight bearing tolerance improved 50% in 4 weeks     Planned interventions:  home exercise program, patient education, manual therapy, graded activity, flexibility, functional range of motion exercises and strengthening     Duration in visits:  12  Frequency: 2 visits per week  Duration in weeks:  6     History of Current Injury: patient reports long history of right knee pain  She has had physical therapy, injections, and prior arthroscopic surgery  She has pain with all weight bearing activities and wears a brace for support  Descending stairs is very tough as well as getting up and down from chairs  Pain is sharp and her knee will lock in slight flexion at times  Post-OP:   23 post op right TKA  She is using a rolling walker and able to get around the house including stairs  Using a cane for stairs    She had nausea following anesthesia but cleared up about 24 hours ago  She took pain meds last night and again this morning    She feels the bandaging and wrap is slipping       Pain location: anterior lower leg, posterior knee  Pain descriptors:  aching  Pain intensity:  3-4/10 with pain meds, 8/10 without meds     Aggravating factors: all weight bearing activities  Easing factors: brace for stability     Patient goals:  decreased pain, independence with ADLs, increased mobility and return to recreation     Objective      Active Range of Motion      Right Knee   Flexion: 60 degrees   Extensor la degrees      Passive Range of Motion      Right Knee   Flexion: 75 degrees   Extension: 7 (started at 13) degrees      Strength/Myotome Testing      Right Knee   Flexion: 3+  Extension: 3-     Inspection: color/temp wnl  Girth: widest portion of gastroc: +2 8cm on the right but this includes layer of ace warp so well wnl    Gait: rolling walker, knee held in flexion position     Precautions: none        Manuals                      Right knee manual stretching                                                                                               Neuro Re-Ed                                                                                                                                                                                               Ther Ex                       Quad sets  :03x30                     Ankle pumps                       Standing hip 3-way  flexion 20                     Side stepping                       Mini squats  20                     Step ups                       Heel slides  setaed 4'                     pball leg curls                       Heel prop  4'                       Standing knee flexion                                                                       Ther Activity                                                                       Gait Training                                                                       Modalities

## 2023-05-08 ENCOUNTER — OFFICE VISIT (OUTPATIENT)
Dept: PHYSICAL THERAPY | Facility: CLINIC | Age: 63
End: 2023-05-08

## 2023-05-08 DIAGNOSIS — M17.11 PRIMARY OSTEOARTHRITIS OF RIGHT KNEE: Primary | ICD-10-CM

## 2023-05-08 RX ORDER — COVID-19 ANTIGEN TEST
KIT MISCELLANEOUS
COMMUNITY
Start: 2023-03-30

## 2023-05-08 NOTE — PROGRESS NOTES
Daily Note     Today's date: 2023  Patient name: Nikki Hammond  : 1960  MRN: [id number]  Referring provider: Rachell Crigler, MD  Dx:   Encounter Diagnosis     ICD-10-CM    1  Primary osteoarthritis of right knee  M17 11         Summary:  1  Color, temp, swelling wnl  2  Passive ROM 5-75, progressing well  3  Using rolling walker, will transition off when quad strength improves  4  Has been consistent with exercises at home               Subjective: she was going for walks in her driveway over the weekend  She had increased pain Friday but felt better the rest of the weekend  Objective: See treatment diary below      Assessment: Tolerated treatment well  Patient exhibited good technique with therapeutic exercises and would benefit from continued PT  PROM progressing well with good tolerance to stretches  Cueing during gait training to push off MTPs and lift heel to get more knee flexion  Plan: Progress treatment as tolerated         Precautions: none      Manuals 23           Right knee manual stretching  SY seated for flexion                                                  Neuro Re-Ed                                                                                                        Ther Ex             Quad sets  :05x4' with heel prop           Heel raises  30           Standing hip 3-way  20ea                        Side stepping  90s           Mini squats  20           Step ups  nv           Heel slides  :10x4' seated and supine           Seated knee extension stretching  :10x4', heel on stool           pball leg curls             SLR  AAROM 2x10                                                               Standing knee flexion  20                                     Ther Activity                                       Gait Training             2 laps  320' RW with cueing                        Modalities

## 2023-05-09 ENCOUNTER — HOSPITAL ENCOUNTER (OUTPATIENT)
Dept: RADIOLOGY | Facility: HOSPITAL | Age: 63
Discharge: HOME | End: 2023-05-09
Attending: ORTHOPAEDIC SURGERY

## 2023-05-09 ENCOUNTER — OFFICE VISIT (OUTPATIENT)
Dept: OBGYN CLINIC | Facility: HOSPITAL | Age: 63
End: 2023-05-09

## 2023-05-09 VITALS
HEIGHT: 63 IN | HEART RATE: 84 BPM | SYSTOLIC BLOOD PRESSURE: 130 MMHG | BODY MASS INDEX: 29.23 KG/M2 | DIASTOLIC BLOOD PRESSURE: 78 MMHG

## 2023-05-09 DIAGNOSIS — M17.11 PRIMARY OSTEOARTHRITIS OF RIGHT KNEE: Primary | ICD-10-CM

## 2023-05-09 DIAGNOSIS — G89.18 POST-OPERATIVE PAIN: ICD-10-CM

## 2023-05-09 DIAGNOSIS — Z47.1 AFTERCARE FOLLOWING RIGHT KNEE JOINT REPLACEMENT SURGERY: ICD-10-CM

## 2023-05-09 DIAGNOSIS — Z96.651 AFTERCARE FOLLOWING RIGHT KNEE JOINT REPLACEMENT SURGERY: ICD-10-CM

## 2023-05-09 RX ORDER — OXYCODONE HYDROCHLORIDE 5 MG/1
5 TABLET ORAL EVERY 4 HOURS PRN
Qty: 30 TABLET | Refills: 0 | Status: SHIPPED | OUTPATIENT
Start: 2023-05-09 | End: 2023-05-19

## 2023-05-09 NOTE — PROGRESS NOTES
Assessment:   Diagnosis ICD-10-CM Associated Orders   1  Primary osteoarthritis of right knee  M17 11       2  Post-operative pain  G89 18           Plan:  • X-rays taken and reviewed, physical exam performed  • She is doing well and is encouraged 1 week status post right total knee arthroplasty  • Her incision is healing and staples will remain in  She may get her incision wet, do not submerge, pat dry  • Continue physical therapy and home exercise program to maximize recovery  • Walker to cane progression when able to do so  • Ice and elevate for swelling reduction  • Refill of oxycodone sent to her pharmacy  Continue medication routine as discussed continue Lovenox for DVT prophylaxis      To do next visit:  Return in about 1 week (around 5/16/2023) for incision re-check and staple removal     The above stated was discussed in layman's terms and the patient expressed understanding  All questions were answered to the patient's satisfaction  Scribe Attestation    I,:  Deric Amaral am acting as a scribe while in the presence of the attending physician :       I,:  Tonya Stewart MD personally performed the services described in this documentation    as scribed in my presence :             Subjective:   Favio Montana is a 70 y o  female who presents with her  first postoperative visit 1 week status post right total knee arthroplasty  She presents using a rolling walker ambulatory assistance  She has been administering her Lovenox for DVT prophylaxis  She has been managing her pain with oxycodone and Tylenol as instructed  She is attending physical therapy  She denies any calf or thigh pain  Denies any fevers or chills        Review of systems negative unless otherwise specified in HPI  Review of Systems    Past Medical History:   Diagnosis Date   • Disease of thyroid gland    • GERD (gastroesophageal reflux disease)        Past Surgical History:   Procedure Laterality Date   • ANTERIOR CRUCIATE LIGAMENT REPAIR Bilateral    • COLONOSCOPY  2017    normal   • JOINT REPLACEMENT     • [place] ARTHRP KNE CONDYLE&PLATU MEDIAL&LAT COMPARTMENTS Right 5/1/2023    Procedure: ARTHROPLASTY KNEE TOTAL W ROBOT;  Surgeon: Ephraim Medina MD;  Location: [facility];  Service: Orthopedics       Family History   Problem Relation Age of Onset   • Prostate cancer Father         62s or 76s   • Breast cancer Maternal Grandmother 48   • Breast cancer Paternal Grandmother 61        in her 62s   • No Known Problems Mother    • Lymphoma Sister    • No Known Problems Daughter    • No Known Problems Maternal Grandfather    • No Known Problems Paternal Grandfather    • No Known Problems Sister    • No Known Problems Sister    • No Known Problems Paternal Aunt        Social History     Occupational History   • Not on file   Tobacco Use   • Smoking status: Never   • Smokeless tobacco: Never   Vaping Use   • Vaping Use: Never used   Substance and Sexual Activity   • Alcohol use:  Yes     Alcohol/week: 1 0 standard drink     Types: 1 Glasses of wine per week     Comment: occassional   • Drug use: Never   • Sexual activity: Yes     Partners: Male         Current Outpatient Medications:   •  ascorbic acid (VITAMIN C) 500 mg tablet, TAKE 1 TABLET (500 MG TOTAL) BY MOUTH DAILY START 30 DAYS PRIOR TO SURGERY, Disp: 30 tablet, Rfl: 0  •  cholecalciferol (VITAMIN D3) 25 mcg (1,000 units) tablet, , Disp: , Rfl:   •  [employer] Covid-19 At Home Test Kit KIT, FOLLOW INSTRUCTIONS INCLUDED WITH THE PACKAGE , Disp: , Rfl:   •  enoxaparin (LOVENOX) 40 mg/0 4 mL, Inject 0 4 mL (40 mg total) under the skin daily for 28 days Start injections after surgery, Disp: 11 2 mL, Rfl: 0  •  ferrous sulfate 324 (65 Fe) mg, Take 1 tablet (324 mg total) by mouth 2 (two) times a day before meals Start 30 days prior to surgery, Disp: 60 tablet, Rfl: 0  •  folic acid (FOLVITE) 1 mg tablet, Take 1 tablet (1 mg total) by mouth daily Start 30 days prior to "surgery, Disp: 30 tablet, Rfl: 0  •  levothyroxine 75 mcg tablet, Take 75 mcg by mouth daily, Disp: , Rfl:   •  multivitamin (THERAGRAN) TABS, Take 1 tablet by mouth daily, Disp: , Rfl:   •  omeprazole (PriLOSEC) 40 MG capsule, Take 40 mg by mouth daily, Disp: , Rfl:   •  oxyCODONE (Roxicodone) 5 immediate release tablet, Take 1 tablet (5 mg total) by mouth every 4 (four) hours as needed for severe pain for up to 10 days Max Daily Amount: 30 mg, Disp: 30 tablet, Rfl: 0  •  Rhubarb (ESTROVEN COMPLETE PO), Take by mouth in the morning, Disp: , Rfl:     Allergies   Allergen Reactions   • Dilaudid [Hydromorphone] Vomiting     Okay to give oxy    • Morphine Nausea Only   • Sulfa Antibiotics Hives   • Sulfacetamide Sodium-Sulfur Rash            Vitals:    05/09/23 1016   BP: 130/78   Pulse: 84       Objective:                    Right Knee Exam     Range of Motion   Right knee extension: passively to within 5 degrees of full extension  Right knee flexion: 45-50 degrees  Other   Swelling: moderate    Comments:    Healing anterior incision with staples in place, no drainage, appropriate amount of warmth  No gross signs of infection  Calf and thigh are soft and non-tender, no signs of DVT  Can recruit quadriceps but cannot extend knee against gravity               Diagnostics, reviewed and taken today if performed as documented: The attending physician has personally reviewed the pertinent films in PACS and interpretation is as follows:    Right knee x-rays taken and reviewed in the office today show: Total knee prosthesis in excellent position alignment, well bonded, no signs of loosening or stress shielding  Superficial skin staples noted  Procedures, if performed today:    Procedures    None performed      Portions of the record may have been created with voice recognition software    Occasional wrong word or \"sound a like\" substitutions may have occurred due to the inherent limitations of voice " recognition software  Read the chart carefully and recognize, using context, where substitutions have occurred

## 2023-05-11 ENCOUNTER — OFFICE VISIT (OUTPATIENT)
Dept: PHYSICAL THERAPY | Facility: CLINIC | Age: 63
End: 2023-05-11

## 2023-05-11 ENCOUNTER — TELEPHONE (OUTPATIENT)
Dept: OBGYN CLINIC | Facility: HOSPITAL | Age: 63
End: 2023-05-11

## 2023-05-11 DIAGNOSIS — M17.11 PRIMARY OSTEOARTHRITIS OF RIGHT KNEE: Primary | ICD-10-CM

## 2023-05-11 NOTE — TELEPHONE ENCOUNTER
Caller: Robert Todd    Doctor: Ava Saunders    Reason for call: Patient calling to report that her oxycodone is requiring a prior auth       Call back#: [phone]

## 2023-05-11 NOTE — PROGRESS NOTES
Daily Note     Today's date: 2023  Patient name: Julieth Atkins  : 1960  MRN: [id number]  Referring provider: Chantell Mendez MD  Dx:   Encounter Diagnosis     ICD-10-CM    1  Primary osteoarthritis of right knee  M17 11                      Subjective: notes she is doing well, physician pleased with her recovery per patient  Objective: See treatment diary below      Assessment: Tolerated treatment well  Patient exhibited good technique with therapeutic exercises and would benefit from continued PT  Continued progression of exercises; limited right knee movement when walking, decreased extension and flexion  PROM 6-78      Plan: Progress treatment as tolerated         Precautions: none      Manuals 23          Right knee manual stretching  SY seated for flexion SY                                                 Neuro Re-Ed                                                                                                        Ther Ex             Quad sets  :05x4' with heel prop :05x4' with heel prop          Heel raises  30 30          Standing hip 3-way  20ea 30ea                       Side stepping  90s 2'          Mini squats  20 30          Step ups  nv           Heel slides  :10x4' seated and supine :10x3'ea seated and supine          Seated knee extension stretching  :10x4', heel on stool :10x3', heel on stool          pball leg curls             SLR  AAROM 2x10 Standing 30          SB stretch   :15x5                                                 Standing knee flexion  20 30                                    Ther Activity                                       Gait Training             2 laps  320' RW with cueing 100' RW with cueing                       Modalities

## 2023-05-15 ENCOUNTER — OFFICE VISIT (OUTPATIENT)
Dept: PHYSICAL THERAPY | Facility: CLINIC | Age: 63
End: 2023-05-15

## 2023-05-15 DIAGNOSIS — M17.11 PRIMARY OSTEOARTHRITIS OF RIGHT KNEE: Primary | ICD-10-CM

## 2023-05-15 NOTE — PROGRESS NOTES
"Daily Note     Today's date: 5/15/2023  Patient name: Ken Espinosa  : 1960  MRN: [id number]  Referring provider: Nguyễn Miles MD  Dx:   Encounter Diagnosis     ICD-10-CM    1  Primary osteoarthritis of right knee  M17 11                      Subjective: Patient stated no significant pain prior to treatment session  Objective: See treatment diary below      Assessment: Patient performed exercises without c/o pain; demonstrated minimal pain with manual PROM  Plan: Progress treatment as tolerated         Precautions: none      Manuals 4/24 5/8/23 5/11 5/15         Right knee manual stretching  SY seated for flexion SY KK                                                Neuro Re-Ed                                                                                                        Ther Ex             Quad sets  :05x4' with heel prop :05x4' with heel prop :05x5' with heel prop         Heel raises  30 30 30         Standing hip 3-way  20ea 30ea 30 ea                      Side stepping  90s 2' 2'         Mini squats  20 30 30         Step ups  nv           Heel slides  :10x4' seated and supine :10x3'ea seated and supine :10x5' supine         Seated knee extension stretching  :10x4', heel on stool :10x3', heel on stool :10x3', heel on stool         pball leg curls             SLR  AAROM 2x10 Standing 30 Standing 30         SB stretch   :15x5 5x20\"                                                Standing knee flexion  20 30 30                                   Ther Activity                                       Gait Training             2 laps  320' RW with cueing 100' RW with cueing 125' RW with cueing                      Modalities                                            "

## 2023-05-16 ENCOUNTER — OFFICE VISIT (OUTPATIENT)
Dept: OBGYN CLINIC | Facility: HOSPITAL | Age: 63
End: 2023-05-16

## 2023-05-16 VITALS
HEART RATE: 102 BPM | DIASTOLIC BLOOD PRESSURE: 73 MMHG | HEIGHT: 63 IN | BODY MASS INDEX: 29.23 KG/M2 | SYSTOLIC BLOOD PRESSURE: 129 MMHG

## 2023-05-16 DIAGNOSIS — Z96.651 AFTERCARE FOLLOWING RIGHT KNEE JOINT REPLACEMENT SURGERY: Primary | ICD-10-CM

## 2023-05-16 DIAGNOSIS — Z47.1 AFTERCARE FOLLOWING RIGHT KNEE JOINT REPLACEMENT SURGERY: Primary | ICD-10-CM

## 2023-05-16 NOTE — PROGRESS NOTES
Assessment:   Diagnosis ICD-10-CM Associated Orders   1  Aftercare following right knee joint replacement surgery  Z47 1     Z96 651           Plan:  • 2nd post-operative visit 2 weeks s/p right TKA  • Her staples are removed  • Continue PT and HEP  • Ice and elevate for swelling reduction  • WBAT  • Complete lovenox as instructed     To do next visit:  Return in about 4 weeks (around 6/13/2023) for re-check  The above stated was discussed in layman's terms and the patient expressed understanding  All questions were answered to the patient's satisfaction  Scribe Attestation    I,:  Darius Bellamy am acting as a scribe while in the presence of the attending physician :       I,:  Marlyn Darby MD personally performed the services described in this documentation    as scribed in my presence :             Subjective:   Melissa León is a 70 y o  female who presents 2nd post-op visit 2 weeks s/p right TKA  Overall she is doing well  She presents using a rolling walker  She is attending PT  She has been administering her lovenox  She denies any calf or thigh pain         Review of systems negative unless otherwise specified in HPI  Review of Systems    Past Medical History:   Diagnosis Date   • Disease of thyroid gland    • GERD (gastroesophageal reflux disease)        Past Surgical History:   Procedure Laterality Date   • ANTERIOR CRUCIATE LIGAMENT REPAIR Bilateral    • COLONOSCOPY  2017    normal   • JOINT REPLACEMENT     • [place] ARTHRP KNE CONDYLE&PLATU MEDIAL&LAT COMPARTMENTS Right 5/1/2023    Procedure: ARTHROPLASTY KNEE TOTAL W ROBOT;  Surgeon: Marlyn Darby MD;  Location:  [facility];  Service: Orthopedics       Family History   Problem Relation Age of Onset   • Prostate cancer Father         62s or 76s   • Breast cancer Maternal Grandmother 48   • Breast cancer Paternal Grandmother 61        in her 62s   • No Known Problems Mother    • Lymphoma Sister    • No Known Problems Daughter    • No Known Problems Maternal Grandfather    • No Known Problems Paternal Grandfather    • No Known Problems Sister    • No Known Problems Sister    • No Known Problems Paternal Aunt        Social History     Occupational History   • Not on file   Tobacco Use   • Smoking status: Never   • Smokeless tobacco: Never   Vaping Use   • Vaping Use: Never used   Substance and Sexual Activity   • Alcohol use:  Yes     Alcohol/week: 1 0 standard drink     Types: 1 Glasses of wine per week     Comment: occassional   • Drug use: Never   • Sexual activity: Yes     Partners: Male         Current Outpatient Medications:   •  cholecalciferol (VITAMIN D3) 25 mcg (1,000 units) tablet, , Disp: , Rfl:   •  [employer] Covid-19 At Home Test Kit KIT, FOLLOW INSTRUCTIONS INCLUDED WITH THE PACKAGE , Disp: , Rfl:   •  enoxaparin (LOVENOX) 40 mg/0 4 mL, Inject 0 4 mL (40 mg total) under the skin daily for 28 days Start injections after surgery, Disp: 11 2 mL, Rfl: 0  •  levothyroxine 75 mcg tablet, Take 75 mcg by mouth daily, Disp: , Rfl:   •  multivitamin (THERAGRAN) TABS, Take 1 tablet by mouth daily, Disp: , Rfl:   •  omeprazole (PriLOSEC) 40 MG capsule, Take 40 mg by mouth daily, Disp: , Rfl:   •  oxyCODONE (Roxicodone) 5 immediate release tablet, Take 1 tablet (5 mg total) by mouth every 4 (four) hours as needed for severe pain for up to 10 days Max Daily Amount: 30 mg, Disp: 30 tablet, Rfl: 0  •  Rhubarb (ESTROVEN COMPLETE PO), Take by mouth in the morning, Disp: , Rfl:     Allergies   Allergen Reactions   • Dilaudid [Hydromorphone] Vomiting     Okay to give oxy    • Morphine Nausea Only   • Sulfa Antibiotics Hives   • Sulfacetamide Sodium-Sulfur Rash            Vitals:    05/16/23 1051   BP: 129/73   Pulse: 102       Objective:                    Right Knee Exam     Range of Motion   Extension: 5   Flexion: 80     Other   Erythema: absent  Right knee swelling: modest     Comments:    Healed anterior incision, staples removed  Intact extensor "mechanism  Appropriate amount of warmth  No signs of infection  Calf and thigh are soft and non-tender, no signs of DVT            Diagnostics, reviewed and taken today if performed as documented:    None performed            Procedures, if performed today:    Procedures    None performed      Portions of the record may have been created with voice recognition software  Occasional wrong word or \"sound a like\" substitutions may have occurred due to the inherent limitations of voice recognition software  Read the chart carefully and recognize, using context, where substitutions have occurred    "

## 2023-05-18 ENCOUNTER — OFFICE VISIT (OUTPATIENT)
Dept: PHYSICAL THERAPY | Facility: CLINIC | Age: 63
End: 2023-05-18

## 2023-05-18 DIAGNOSIS — M17.11 PRIMARY OSTEOARTHRITIS OF RIGHT KNEE: Primary | ICD-10-CM

## 2023-05-18 NOTE — PROGRESS NOTES
"Daily Note     Today's date: 2023  Patient name: Luna Wolf  : 1960  MRN: [id number]  Referring provider: Fadia Mcelroy MD  Dx:   Encounter Diagnosis     ICD-10-CM    1  Primary osteoarthritis of right knee  M17 11                      Subjective: Patient offers no new complaints to begin session  Decreased RW use in home, increased SPC use  Objective: See treatment diary below    Assessment: Patient tolerated treatment well overall  Intermittent VCs for form maintenance throughout with good carryover  Good tolerance to gait training with SPC, cueing for placement and step length  Good tolerance to manual therapy  Passively patient achieves extension near 0 deg, during functional tasks R knee held in slight flexion  Continue as able nv  Plan: Progress treatment as tolerated         Precautions: none    Manuals 4/24 5/8/23 5/11 5/15 5/18        Right knee manual stretching  SY seated for flexion SY KK LH                                               Neuro Re-Ed                                                                                                        Ther Ex             Quad sets  :05x4' with heel prop :05x4' with heel prop :05x5' with heel prop 20x5\"        Heel raises  30 30 30 30x        Standing hip 3-way  20ea 30ea 30 ea 30x ea                     Side stepping  90s 2' 2' 3'        Mini squats  20 30 30 30x        Step ups  nv           Heel slides  :10x4' seated and supine :10x3'ea seated and supine :10x5' supine supine 10\", 5'        Seated knee extension stretching  :10x4', heel on stool :10x3', heel on stool :10x3', heel on stool 3', 10\" H heel on stool         pball leg curls             SLR  AAROM 2x10 Standing 30 Standing 30 As above        SB stretch   :15x5 5x20\" 5x20\"                                               Standing knee flexion  20 30 30 30x        LAQ to HS curl pause at end range     EOT 2x10                     Ther Activity                            " Gait Training     5/18        2 laps  320' RW with cueing 100' RW with cueing 125' RW with cueing 2 laps w/ [facility]

## 2023-05-22 ENCOUNTER — OFFICE VISIT (OUTPATIENT)
Dept: PHYSICAL THERAPY | Facility: CLINIC | Age: 63
End: 2023-05-22

## 2023-05-22 DIAGNOSIS — M17.11 PRIMARY OSTEOARTHRITIS OF RIGHT KNEE: Primary | ICD-10-CM

## 2023-05-22 NOTE — PROGRESS NOTES
"Daily Note     Today's date: 2023  Patient name: Tonie Yusuf  : 1960  MRN: [id number]  Referring provider: Jose Bob MD  Dx:   Encounter Diagnosis     ICD-10-CM    1  Primary osteoarthritis of right knee  M17 11                      Subjective: patient reports she over did it this the weekend and had increased pain        Objective: See treatment diary below      Assessment: Tolerated treatment well  Patient exhibited good technique with therapeutic exercises and would benefit from continued PT  PROM 7-, patient will push stretching more at home      Plan: Progress treatment as tolerated         Precautions: none    Manuals 4/24 5/8/23 5/11 5/15 5/18 5/22       Right knee manual stretching  SY seated for flexion SY KK LH SY                                 Bike      Rocking,  7'       Neuro Re-Ed                                                                                                        Ther Ex             Quad sets  :05x4' with heel prop :05x4' with heel prop :05x5' with heel prop 20x5\" :05x5' with heel prop       Heel raises  30 30 30 30x 30x       Standing hip 3-way  20ea 30ea 30 ea 30x ea 30xea                    Side stepping  90s 2' 2' 3' 3'       Mini squats  20 30 30 30x 30x       Step ups  nv           Heel slides  :10x4' seated and supine :10x3'ea seated and supine :10x5' supine supine 10\", 5' supine 10\", 5'       Seated knee extension stretching  :10x4', heel on stool :10x3', heel on stool :10x3', heel on stool 3', 10\" H heel on stool  3', 10\" H heel on stool        pball leg curls             SLR  AAROM 2x10 Standing 30 Standing 30 As above 30 standing       SB stretch   :15x5 5x20\" 5x20\" :20x5                                              Standing knee flexion  20 30 30 30x 30x       LAQ to HS curl pause at end range     EOT 2x10 2x10                    Ther Activity                                       Gait Training             2 laps  320' RW with cueing 100' " RW with cueing 125' RW with cueing 2 laps w/ [facility]

## 2023-05-25 ENCOUNTER — OFFICE VISIT (OUTPATIENT)
Dept: PHYSICAL THERAPY | Facility: CLINIC | Age: 63
End: 2023-05-25

## 2023-05-25 DIAGNOSIS — M17.11 PRIMARY OSTEOARTHRITIS OF RIGHT KNEE: Primary | ICD-10-CM

## 2023-05-25 NOTE — PROGRESS NOTES
"Daily Note     Today's date: 2023  Patient name: Harvey Almaguer  : 1960  MRN: [id number]  Referring provider: Karrie Lorenzo MD  Dx:   Encounter Diagnosis     ICD-10-CM    1  Primary osteoarthritis of right knee  M17 11                      Subjective: notes the stiffness has decreased and she has been working on her bike at home      Objective: See treatment diary below      Assessment: Tolerated treatment well  Patient exhibited good technique with therapeutic exercises and would benefit from continued PT  Close to full revolution on bike, PROM       Plan: Progress treatment as tolerated         Precautions: none    Manuals 4/24 5/8/23 5/11 5/15 5/18 5/22 5/25      Right knee manual stretching  SY seated for flexion SY KK LH SY SY                                Bike      Rocking,  7' Rocking,  7'      Neuro Re-Ed                                                                                                        Ther Ex             Quad sets  :05x4' with heel prop :05x4' with heel prop :05x5' with heel prop 20x5\" :05x5' with heel prop :05x5' with heel prop      Heel raises  30 30 30 30x 30x #2 5 30      Standing hip 3-way  20ea 30ea 30 ea 30x ea 30xea #2 5 30                   Side stepping  90s 2' 2' 3' 3' #2 5 2'      Mini squats  20 30 30 30x 30x 30x      Step ups  nv     4\" 30      Heel slides  :10x4' seated and supine :10x3'ea seated and supine :10x5' supine supine 10\", 5' supine 10\", 5' supine 10\", 5'      Seated knee extension stretching  :10x4', heel on stool :10x3', heel on stool :10x3', heel on stool 3', 10\" H heel on stool  3', 10\" H heel on stool  3', 10\" H heel on stool       pball leg curls             SLR  AAROM 2x10 Standing 30 Standing 30 As above 30 standing 30 standing      SB stretch   :15x5 5x20\" 5x20\" :20x5 :20x5                                             Standing knee flexion  20 30 30 30x 30x 30x #2 5      LAQ to HS curl pause at end range     EOT 2x10 2x10 2x10    " Ther Activity                                       Gait Training     5/18        2 laps  320' RW with cueing 100' RW with cueing 125' RW with cueing 2 laps w/ [facility]

## 2023-05-30 ENCOUNTER — OFFICE VISIT (OUTPATIENT)
Dept: PHYSICAL THERAPY | Facility: CLINIC | Age: 63
End: 2023-05-30

## 2023-05-30 DIAGNOSIS — M17.11 PRIMARY OSTEOARTHRITIS OF RIGHT KNEE: Primary | ICD-10-CM

## 2023-05-30 NOTE — PROGRESS NOTES
"Daily Note     Today's date: 2023  Patient name: Sharda Olmos  : 1960  MRN: [id number]  Referring provider: Nikky Ovalle MD  Dx:   Encounter Diagnosis     ICD-10-CM    1  Primary osteoarthritis of right knee  M17 11                    Subjective: she is no longer using the walker, cane outside of the house and usually no assistive device in the house      Objective: See treatment diary below      Assessment: Tolerated treatment well  Patient exhibited good technique with therapeutic exercises and would benefit from continued PT  PROM  and she was able to perform full revolution int he bike after a few rocking back and forth       Plan: Progress treatment as tolerated         Precautions: none    Manuals 4/24 5/8/23 5/11 5/15 5/18 5/22 5/25 5/30     Right knee manual stretching  SY seated for flexion SY KK LH SY SY SY                               Bike      Rocking,  7' Rocking,  7' 8' full     Neuro Re-Ed                                                                                                        Ther Ex             Quad sets  :05x4' with heel prop :05x4' with heel prop :05x5' with heel prop 20x5\" :05x5' with heel prop :05x5' with heel prop :05x5' with heel prop     Heel raises  30 30 30 30x 30x #2 5 30 #3 30     Standing hip 3-way  20ea 30ea 30 ea 30x ea 30xea #2 5 30 #3 30                  Side stepping  90s 2' 2' 3' 3' #2 5 2' #2 5 2'     Mini squats  20 30 30 30x 30x 30x 30x     Step ups  nv     4\" 30 6\" 30     Heel slides  :10x4' seated and supine :10x3'ea seated and supine :10x5' supine supine 10\", 5' supine 10\", 5' supine 10\", 5' supine 10\", 5'     Seated knee extension stretching  :10x4', heel on stool :10x3', heel on stool :10x3', heel on stool 3', 10\" H heel on stool  3', 10\" H heel on stool  3', 10\" H heel on stool  3', 10\" H heel on stool      pball leg curls             SLR  AAROM 2x10 Standing 30 Standing 30 As above 30 standing 30 standing 30 standing     SB stretch " "  :15x5 5x20\" 5x20\" :20x5 :20x5 :20x5                                            Standing knee flexion  20 30 30 30x 30x 30x #2 5 30x #3     LAQ to HS curl pause at end range     EOT 2x10 2x10 2x10 2x10                  Ther Activity                                       Gait Training     5/18        2 laps  320' RW with cueing 100' RW with cueing 125' RW with cueing 2 laps w/ [facility]                     Modalities                                            "

## 2023-06-01 ENCOUNTER — HOSPITAL ENCOUNTER (OUTPATIENT)
Dept: RADIOLOGY | Age: 63
Discharge: HOME | End: 2023-06-01

## 2023-06-01 VITALS — WEIGHT: 165 LBS | BODY MASS INDEX: 29.23 KG/M2 | HEIGHT: 63 IN

## 2023-06-01 DIAGNOSIS — Z12.31 ENCOUNTER FOR SCREENING MAMMOGRAM FOR MALIGNANT NEOPLASM OF BREAST: ICD-10-CM

## 2023-06-02 ENCOUNTER — OFFICE VISIT (OUTPATIENT)
Dept: PHYSICAL THERAPY | Facility: CLINIC | Age: 63
End: 2023-06-02

## 2023-06-02 DIAGNOSIS — M17.11 PRIMARY OSTEOARTHRITIS OF RIGHT KNEE: Primary | ICD-10-CM

## 2023-06-02 NOTE — PROGRESS NOTES
"Daily Note     Today's date: 2023  Patient name: Wilian Hayden  : 1960  MRN: [id number]  Referring provider: Taqueria Jaramillo MD  Dx:   Encounter Diagnosis     ICD-10-CM    1  Primary osteoarthritis of right knee  M17 11                      Subjective: doing well, has been using the bike at home      Objective: See treatment diary below      Assessment: Tolerated treatment well  Patient exhibited good technique with therapeutic exercises and would benefit from continued PT  PROM extension to 5, she will start prone hang at home daily  PROM 5-      Plan: Progress treatment as tolerated         Precautions: none    Manuals 4/24 5/8/23 5/11 5/15 5/18 5/22 5/25 5/30 6/2    Right knee manual stretching  SY seated for flexion SY KK LH SY SY SY SY                              Bike      Rocking,  7' Rocking,  7' 8' full 8' full    Neuro Re-Ed                                                                                                        Ther Ex             Quad sets  :05x4' with heel prop :05x4' with heel prop :05x5' with heel prop 20x5\" :05x5' with heel prop :05x5' with heel prop :05x5' with heel prop :05x5' with heel prop    Heel raises  30 30 30 30x 30x #2 5 30 #3 30 #3 30    Standing hip 3-way  20ea 30ea 30 ea 30x ea 30xea #2 5 30 #3 30 #3 30                 Side stepping  90s 2' 2' 3' 3' #2 5 2' #2 5 2' #2 5 2'    Mini squats  20 30 30 30x 30x 30x 30x 30x    Step ups  nv     4\" 30 6\" 30 6\" 30    Heel slides  :10x4' seated and supine :10x3'ea seated and supine :10x5' supine supine 10\", 5' supine 10\", 5' supine 10\", 5' supine 10\", 5' supine 10\", 5'    Seated knee extension stretching  :10x4', heel on stool :10x3', heel on stool :10x3', heel on stool 3', 10\" H heel on stool  3', 10\" H heel on stool  3', 10\" H heel on stool  3', 10\" H heel on stool  3', 10\" H heel on stool     pball leg curls             SLR  AAROM 2x10 Standing 30 Standing 30 As above 30 standing 30 standing 30 standing 30 " "standing    SB stretch   :15x5 5x20\" 5x20\" :20x5 :20x5 :20x5 :20x5                                           Standing knee flexion  20 30 30 30x 30x 30x #2 5 30x #3 30x #3    LAQ to HS curl pause at end range     EOT 2x10 2x10 2x10 2x10 2x10    Prone hang         nv    Ther Activity                                       Gait Training     5/18        2 laps  320' RW with cueing 100' RW with cueing 125' RW with cueing 2 laps w/ [facility]                     Modalities                                            "

## 2023-06-06 ENCOUNTER — OFFICE VISIT (OUTPATIENT)
Dept: PHYSICAL THERAPY | Facility: CLINIC | Age: 63
End: 2023-06-06
Payer: COMMERCIAL

## 2023-06-06 DIAGNOSIS — M17.11 PRIMARY OSTEOARTHRITIS OF RIGHT KNEE: Primary | ICD-10-CM

## 2023-06-06 PROCEDURE — 97140 MANUAL THERAPY 1/> REGIONS: CPT | Performed by: PHYSICAL THERAPIST

## 2023-06-06 PROCEDURE — 97112 NEUROMUSCULAR REEDUCATION: CPT | Performed by: PHYSICAL THERAPIST

## 2023-06-06 PROCEDURE — 97110 THERAPEUTIC EXERCISES: CPT | Performed by: PHYSICAL THERAPIST

## 2023-06-06 NOTE — PROGRESS NOTES
"Daily Note     Today's date: 2023  Patient name: Karissa Lyon  : 1960  MRN: [id number]  Referring provider: Kaylen Gómez MD  Dx:   Encounter Diagnosis     ICD-10-CM    1  Primary osteoarthritis of right knee  M17 11                      Subjective: she went on a longer walk using walking sticks which she felt was more stable and upright compared to the cane      Objective: See treatment diary below      Assessment: Tolerated treatment well  Patient exhibited good technique with therapeutic exercises and would benefit from continued PT  Started with nu-step to warm up the knee prior to standing exercises  PROM knee flexion to 97      Plan: Progress treatment as tolerated         Precautions: none    Manuals 4/24 5/8/23 5/11 5/15 5/18 5/22 5/25 5/30 6/2 6/6   Right knee manual stretching  SY seated for flexion SY KK LH SY SY SY SY SY                Nu step          5' seat 7 to start   Bike      Rocking,  7' Rocking,  7' 8' full 8' full 10' at end   Neuro Re-Ed                                                                                                        Ther Ex             Quad sets  :05x4' with heel prop :05x4' with heel prop :05x5' with heel prop 20x5\" :05x5' with heel prop :05x5' with heel prop :05x5' with heel prop :05x5' with heel prop :05x5' with heel prop   Heel raises  30 30 30 30x 30x #2 5 30 #3 30 #3 30 #3 30   Standing hip 3-way  20ea 30ea 30 ea 30x ea 30xea #2 5 30 #3 30 #3 30 #3 30                Side stepping  90s 2' 2' 3' 3' #2 5 2' #2 5 2' #2 5 2' #2 5 2'   Mini squats  20 30 30 30x 30x 30x 30x 30x 30   Step ups  nv     4\" 30 6\" 30 6\" 30 8\" 30   Step downs          6\" 20   Heel slides  :10x4' seated and supine :10x3'ea seated and supine :10x5' supine supine 10\", 5' supine 10\", 5' supine 10\", 5' supine 10\", 5' supine 10\", 5' supine 10\", 5'   Seated knee extension stretching  :10x4', heel on stool :10x3', heel on stool :10x3', heel on stool 3', 10\" H heel on stool  3', 10\" " "H heel on stool  3', 10\" H heel on stool  3', 10\" H heel on stool  3', 10\" H heel on stool     pball leg curls             SLR  AAROM 2x10 Standing 30 Standing 30 As above 30 standing 30 standing 30 standing 30 standing    SB stretch   :15x5 5x20\" 5x20\" :20x5 :20x5 :20x5 :20x5 :20x5                                          Standing knee flexion  20 30 30 30x 30x 30x #2 5 30x #3 30x #3 30x #3   LAQ to HS curl pause at end range     EOT 2x10 2x10 2x10 2x10 2x10 2x10   Prone hang         nv 3x1'   Ther Activity                                       Gait Training     5/18        2 laps  320' RW with cueing 100' RW with cueing 125' RW with cueing 2 laps w/ [facility]                     Modalities                                            "

## 2023-06-08 ENCOUNTER — OFFICE VISIT (OUTPATIENT)
Dept: PHYSICAL THERAPY | Facility: CLINIC | Age: 63
End: 2023-06-08
Payer: COMMERCIAL

## 2023-06-08 DIAGNOSIS — M17.11 PRIMARY OSTEOARTHRITIS OF RIGHT KNEE: Primary | ICD-10-CM

## 2023-06-08 PROCEDURE — 97112 NEUROMUSCULAR REEDUCATION: CPT | Performed by: PHYSICAL THERAPIST

## 2023-06-08 PROCEDURE — 97140 MANUAL THERAPY 1/> REGIONS: CPT | Performed by: PHYSICAL THERAPIST

## 2023-06-08 PROCEDURE — 97110 THERAPEUTIC EXERCISES: CPT | Performed by: PHYSICAL THERAPIST

## 2023-06-08 NOTE — PROGRESS NOTES
"Daily Note     Today's date: 2023  Patient name: Iwona Espinoza  : 1960  MRN: [id number]  Referring provider: Anuradha Pimentel MD  Dx:   Encounter Diagnosis     ICD-10-CM    1  Primary osteoarthritis of right knee  M17 11                  Subjective: notes her knee is bending better, doing well with ADLs at home      Objective: See treatment diary below      Assessment: Tolerated treatment well  Patient exhibited good technique with therapeutic exercises and would benefit from continued PT  Doing well with TE, PROM continues to improve and she is able to move through all standing exercises without rest      Plan: Progress treatment as tolerated         Precautions: none    Manuals    Right knee manual stretching SY      SY SY SY SY                Nu step 5' seat 7 to start         5' seat 7 to start   Bike 8' at end      Rocking,  7' 8' full 8' full 10' at end   Neuro Re-Ed                                                                                                        Ther Ex             Quad sets       :05x5' with heel prop :05x5' with heel prop :05x5' with heel prop :05x5' with heel prop   Heel raises       #2 5 30 #3 30 #3 30 #3 30   Standing hip 3-way #3 30      #2 5 30 #3 30 #3 30 #3 30                Side stepping #3 2'      #2 5 2' #2 5 2' #2 5 2' #2 5 2'   Mini squats 30      30x 30x 30x 30   Step ups 8\" 30      4\" 30 6\" 30 6\" 30 8\" 30   Step downs 6\" 30         6\" 20   Heel slides supine 10\", 5'      supine 10\", 5' supine 10\", 5' supine 10\", 5' supine 10\", 5'   Seated knee extension stretching       3', 10\" H heel on stool  3', 10\" H heel on stool  3', 10\" H heel on stool     pball leg curls             SLR       30 standing 30 standing 30 standing    SB stretch :20x5      :20x5 :20x5 :20x5 :20x5                                          Standing knee flexion 30x #3      30x #2 5 30x #3 30x #3 30x #3   LAQ to HS curl pause at end range 20x      2x10 2x10 2x10 2x10 " Prone hang 2x2'        nv 3x1'   Ther Activity                                       Gait Training             2 laps                          Modalities

## 2023-06-12 ENCOUNTER — EVALUATION (OUTPATIENT)
Dept: PHYSICAL THERAPY | Facility: CLINIC | Age: 63
End: 2023-06-12
Payer: COMMERCIAL

## 2023-06-12 DIAGNOSIS — M17.11 PRIMARY OSTEOARTHRITIS OF RIGHT KNEE: Primary | ICD-10-CM

## 2023-06-12 PROCEDURE — 97110 THERAPEUTIC EXERCISES: CPT | Performed by: PHYSICAL THERAPIST

## 2023-06-12 PROCEDURE — 97112 NEUROMUSCULAR REEDUCATION: CPT | Performed by: PHYSICAL THERAPIST

## 2023-06-12 NOTE — PROGRESS NOTES
PT RE-EVALUATION     Today's date: 06/12/23  Patient name: Swetha Escalona  UEI: [id number]  KCI: [id number]  Referring provider: Gini Alan,*  Dx:   1  Primary osteoarthritis of right knee          Swetha Escalona is a 70 y  o  female who is recovering well from right TKA on 5/1/23   ROM is increasing steadily with some stiffness in flexion  Her walking has improved though she is working through 4+ years of poor walking mechanics  This patient would benefit from skilled physical therapy following their surgery to address their listed impairments and functional limitations to maximize functional outcome      Impairments:    restricted ROM    decreased strength   pain with function   activity intolerance   weight bearing intolerance      Prognosis:  Good  Positive and negative prognostic indicator(s):  pain >3 months     Goals:    STG Patient is independent with HEP  (met)  STG Range of motion is improved by 25% in 2 weeks (met)  LTG Range of motion is improved by 50% in 4 weeks (partially met)  LTG Increase strength full grade in 4 weeks  LTG ADL performance improved to prior level of function in 6 weeks  LTG Weight bearing tolerance improved 50% in 4 weeks     Planned interventions:  home exercise program, patient education, manual therapy, graded activity, flexibility, functional range of motion exercises and strengthening     Duration in visits:  8  Frequency: 2 visits per week  Duration in weeks:  4     History of Current Injury:   5/01/23 post op right TKA  She is no longer using an assistive device except for long walks when she uses walking sticks  She is ascending stairs normally but continues to use a step to step pattern descending  No difficulty with house work  She is not driving yet due to stiffness and is unsure how she would do transferring from gas to breaks    Stiffness varies day to day and is not related to level activity      Pain location: anterior lower leg, posterior "knee  Pain descriptors:  stiffness, some twinges  Pain intensity: 0-2     Aggravating factors: all weight bearing activities  Easing factors: brace for stability     Patient goals:  decreased pain, independence with ADLs, increased mobility and return to recreation     Objective      Active Range of Motion      Right Knee   Flexion: 82 (from 60 degrees)   Extensor la (from 45 degrees)     Passive Range of Motion      Right Knee   Flexion: 100 degrees   Extension: 3 (from )3      Strength/Myotome Testing      Right Knee   Flexion: 3+  Extension: 3+ in available range     Inspection: color/temp wnl        Daily Note     Today's date: 2023  Patient name: Mike Bob  : 1960  MRN: [id number]  Referring provider: Noe Artis MD  Dx:   Encounter Diagnosis     ICD-10-CM    1   Primary osteoarthritis of right knee  M17 11                       Precautions: none    Manuals    Right knee manual stretching SY SY     SY SY SY SY                Nu step at start 5' seat 7 to start 5' seat to 7        5' seat 7 to start   Bike 8' at end 8' at end     Rocking,  7' 8' full 8' full 10' at end   Neuro Re-Ed                                                                                                        Ther Ex             Quad sets       :05x5' with heel prop :05x5' with heel prop :05x5' with heel prop :05x5' with heel prop   Heel raises       #2 5 30 #3 30 #3 30 #3 30   Standing hip 3-way #3 30 #4 30     #2 5 30 #3 30 #3 30 #3 30                Side stepping #3 2' #4 30     #2 5 2' #2 5 2' #2 5 2' #2 5 2'   Mini squats 30 30     30x 30x 30x 30   Step ups 8\" 30 8\" 30     4\" 30 6\" 30 6\" 30 8\" 30   Step downs 6\" 30 6\" 30        6\" 20   Heel slides supine 10\", 5' supine 10s, 5'     supine 10\", 5' supine 10\", 5' supine 10\", 5' supine 10\", 5'   Seated knee extension stretching       3', 10\" H heel on stool  3', 10\" H heel on stool  3', 10\" H heel on stool     pball leg curls       " "      SLR       30 standing 30 standing 30 standing    SB stretch :20x5      :20x5 :20x5 :20x5 :20x5   Stairs 6\"  10x                                     Standing knee flexion 30x #3 30x #4     30x #2 5 30x #3 30x #3 30x #3   LAQ to HS curl pause at end range 20x 20x     2x10 2x10 2x10 2x10   Prone hang 2x2' 2x2'       nv 3x1'   Ther Activity                                       Gait Training             2 laps                          Modalities                                            "

## 2023-06-13 ENCOUNTER — OFFICE VISIT (OUTPATIENT)
Dept: OBGYN CLINIC | Facility: HOSPITAL | Age: 63
End: 2023-06-13

## 2023-06-13 VITALS
BODY MASS INDEX: 30.12 KG/M2 | WEIGHT: 170 LBS | SYSTOLIC BLOOD PRESSURE: 137 MMHG | HEIGHT: 63 IN | HEART RATE: 88 BPM | DIASTOLIC BLOOD PRESSURE: 83 MMHG

## 2023-06-13 DIAGNOSIS — Z47.1 AFTERCARE FOLLOWING RIGHT KNEE JOINT REPLACEMENT SURGERY: Primary | ICD-10-CM

## 2023-06-13 DIAGNOSIS — Z96.651 AFTERCARE FOLLOWING RIGHT KNEE JOINT REPLACEMENT SURGERY: Primary | ICD-10-CM

## 2023-06-13 PROCEDURE — 99024 POSTOP FOLLOW-UP VISIT: CPT | Performed by: ORTHOPAEDIC SURGERY

## 2023-06-13 NOTE — PROGRESS NOTES
Assessment:  1  Aftercare following right knee joint replacement surgery            Plan:  She was encouraged to aggressively work on her ROM  She should continue her physical therapy exercises  She may use vitamin E oil, cocoa butter, or mederna over her incision  Follow up in 6 weeks  Repeat x-ray upon arrival     To do next visit:  No follow-ups on file  The above stated was discussed in layman's terms and the patient expressed understanding  All questions were answered to the patient's satisfaction  Scribe Attestation    I,:  Romeo Tse am acting as a scribe while in the presence of the attending physician :       I,:  Chris Lyn MD personally performed the services described in this documentation    as scribed in my presence :             Subjective:   eMlanie Bazan is a 70 y o  female who presents to the office for follow up status post right total knee arthroplasty performed on 05/01/2023  She has been doing well overall  She has been progressing with physical therapy         Review of systems negative unless otherwise specified in HPI    Past Medical History:   Diagnosis Date   • Disease of thyroid gland    • GERD (gastroesophageal reflux disease)        Past Surgical History:   Procedure Laterality Date   • ANTERIOR CRUCIATE LIGAMENT REPAIR Bilateral    • COLONOSCOPY  2017    normal   • JOINT REPLACEMENT     • TN ARTHRP KNE CONDYLE&PLATU MEDIAL&LAT COMPARTMENTS Right 5/1/2023    Procedure: ARTHROPLASTY KNEE TOTAL W ROBOT;  Surgeon: Chris Lyn MD;  Location: [facility] OR;  Service: Orthopedics       Family History   Problem Relation Age of Onset   • No Known Problems Mother    • Prostate cancer Father         62s or 76s   • Lymphoma Sister    • No Known Problems Sister    • No Known Problems Sister    • No Known Problems Daughter    • Breast cancer Maternal Grandmother 48   • No Known Problems Maternal Grandfather    • Breast cancer Paternal Grandmother 61        in "her 62s   • No Known Problems Paternal Grandfather    • No Known Problems Paternal Aunt        Social History     Occupational History   • Not on file   Tobacco Use   • Smoking status: Never   • Smokeless tobacco: Never   Vaping Use   • Vaping Use: Never used   Substance and Sexual Activity   • Alcohol use: Yes     Alcohol/week: 1 0 standard drink of alcohol     Types: 1 Glasses of wine per week     Comment: occassional   • Drug use: Never   • Sexual activity: Yes     Partners: Male         Current Outpatient Medications:   •  cholecalciferol (VITAMIN D3) 25 mcg (1,000 units) tablet, , Disp: , Rfl:   •  [employer] Covid-19 At Home Test Kit KIT, FOLLOW INSTRUCTIONS INCLUDED WITH THE PACKAGE , Disp: , Rfl:   •  levothyroxine 75 mcg tablet, Take 75 mcg by mouth daily, Disp: , Rfl:   •  multivitamin (THERAGRAN) TABS, Take 1 tablet by mouth daily, Disp: , Rfl:   •  omeprazole (PriLOSEC) 40 MG capsule, Take 40 mg by mouth daily, Disp: , Rfl:   •  Rhubarb (ESTROVEN COMPLETE PO), Take by mouth in the morning, Disp: , Rfl:     Allergies   Allergen Reactions   • Dilaudid [Hydromorphone] Vomiting     Okay to give oxy    • Morphine Nausea Only   • Sulfa Antibiotics Hives   • Sulfacetamide Sodium-Sulfur Rash            Vitals:    06/13/23 0825   BP: 137/83   Pulse: 88       Objective:  Physical exam  · General: Awake, Alert, Oriented  · Eyes: Pupils equal, round and reactive to light  · Heart: regular rate and rhythm  · Lungs: No audible wheezing  · Abdomen: soft                    Ortho Exam  Right knee:  Well healed incision  No erythema or drainage  ROM 3-95 without pain    Diagnostics, reviewed and taken today if performed as documented:    None performed     Procedures, if performed today:    None performed      Portions of the record may have been created with voice recognition software  Occasional wrong word or \"sound a like\" substitutions may have occurred due to the inherent limitations of voice recognition software    Read " the chart carefully and recognize, using context, where substitutions have occurred

## 2023-06-14 ENCOUNTER — OFFICE VISIT (OUTPATIENT)
Dept: PHYSICAL THERAPY | Facility: CLINIC | Age: 63
End: 2023-06-14
Payer: COMMERCIAL

## 2023-06-14 DIAGNOSIS — M17.11 PRIMARY OSTEOARTHRITIS OF RIGHT KNEE: Primary | ICD-10-CM

## 2023-06-14 PROCEDURE — 97112 NEUROMUSCULAR REEDUCATION: CPT

## 2023-06-14 PROCEDURE — 97140 MANUAL THERAPY 1/> REGIONS: CPT

## 2023-06-14 PROCEDURE — 97110 THERAPEUTIC EXERCISES: CPT

## 2023-06-14 NOTE — PROGRESS NOTES
"Daily Note     Today's date: 2023  Patient name: Howard Mccain  : 1960  MRN: [id number]  Referring provider: Joanna Cardenas MD  Dx:   Encounter Diagnosis     ICD-10-CM    1  Primary osteoarthritis of right knee  M17 11                      Subjective: Pt states recent MDV went well  Pt states her knee is \"stiff\"; reports no to minimal pain  Objective: See treatment diary below      Assessment: Tolerated treatment well  Patient exhibited good technique with therapeutic exercises and would benefit from continued PT  Good tolerance to manual stretching  No rest needed b/t standing activities  Pt defers bike to end due to time constraints (another appt)  Plan: Progress treatment as tolerated         Precautions: none    Manuals    Right knee manual stretching SY SY LM    SY SY SY SY                Nu step at start 5' seat 7 to start 5' seat to 7 5' seat to 7       5' seat 7 to start   Bike 8' at end 8' at end defers    Rocking,  7' 8' full 8' full 10' at end   Neuro Re-Ed                                                                                                        Ther Ex             Quad sets       :05x5' with heel prop :05x5' with heel prop :05x5' with heel prop :05x5' with heel prop   Heel raises       #2 5 30 #3 30 #3 30 #3 30   Standing hip 3-way #3 30 #4 30 4# 30 ea    #2 5 30 #3 30 #3 30 #3 30                Side stepping #3 2' #4 30 4# 2'    #2 5 2' #2 5 2' #2 5 2' #2 5 2'   Mini squats 30 30 30    30x 30x 30x 30   Step ups 8\" 30 8\" 30 8\" 30    4\" 30 6\" 30 6\" 30 8\" 30   Step downs 6\" 30 6\" 30 6\" 30       6\" 20   Heel slides supine 10\", 5' supine 10s, 5' supine 10x, 5'    supine 10\", 5' supine 10\", 5' supine 10\", 5' supine 10\", 5'   Seated knee extension stretching       3', 10\" H heel on stool  3', 10\" H heel on stool  3', 10\" H heel on stool     pball leg curls             SLR       30 standing 30 standing 30 standing    SB stretch :20x5  " "20\"x5    :20x5 :20x5 :20x5 :20x5   Stairs 6\"  10x                                     Standing knee flexion 30x #3 30x #4 4# x30    30x #2 5 30x #3 30x #3 30x #3   LAQ to HS curl pause at end range 20x 20x x20    2x10 2x10 2x10 2x10   Prone hang 2x2' 2x2' 2x2'      nv 3x1'   Ther Activity                                       Gait Training             2 laps                          Modalities                                            "

## 2023-06-15 ENCOUNTER — APPOINTMENT (OUTPATIENT)
Dept: PHYSICAL THERAPY | Facility: CLINIC | Age: 63
End: 2023-06-15
Payer: COMMERCIAL

## 2023-06-19 ENCOUNTER — OFFICE VISIT (OUTPATIENT)
Dept: PHYSICAL THERAPY | Facility: CLINIC | Age: 63
End: 2023-06-19
Payer: COMMERCIAL

## 2023-06-19 DIAGNOSIS — M17.11 PRIMARY OSTEOARTHRITIS OF RIGHT KNEE: Primary | ICD-10-CM

## 2023-06-19 PROCEDURE — 97110 THERAPEUTIC EXERCISES: CPT | Performed by: PHYSICAL THERAPIST

## 2023-06-19 PROCEDURE — 97140 MANUAL THERAPY 1/> REGIONS: CPT | Performed by: PHYSICAL THERAPIST

## 2023-06-19 NOTE — PROGRESS NOTES
"Daily Note     Today's date: 2023  Patient name: Ayanna Munoz  : 1960  MRN: [id number]  Referring provider: Lata Carlos MD  Dx:   Encounter Diagnosis     ICD-10-CM    1  Primary osteoarthritis of right knee  M17 11                     Subjective: she did a lot of walking over the weekend but she was out of state and didn't perform exercises      Objective: See treatment diary below      Assessment: Tolerated treatment well  Patient exhibited good technique with therapeutic exercises and would benefit from continued PT  Progressed LE strengthening, too much compensation on 8\" step, kept at 6\" today  ROM 3-108, significant increase in flexion  Plan: Progress treatment as tolerated         Precautions: none    Manuals    Right knee manual stretching SY SY LM SY   SY SY SY SY                Nu step at start 5' seat 7 to start 5' seat to 7 5' seat to 7       5' seat 7 to start   Bike 8' at end 8' at end defers 8' at end   Rocking,  7' 8' full 8' full 10' at end   Neuro Re-Ed                                                                                                        Ther Ex             Quad sets       :05x5' with heel prop :05x5' with heel prop :05x5' with heel prop :05x5' with heel prop   Heel raises       #2 5 30 #3 30 #3 30 #3 30   Standing hip 3-way #3 30 #4 30 4# 30 ea 4# 30 ea   #2 5 30 #3 30 #3 30 #3 30                Side stepping #3 2' #4 30 4# 2' #4 2'   #2 5 2' #2 5 2' #2 5 2' #2 5 2'   Mini squats 30 30 30 30   30x 30x 30x 30   Step ups 8\" 30 8\" 30 8\" 30 8\" 30   4\" 30 6\" 30 6\" 30 8\" 30   Step downs 6\" 30 6\" 30 6\" 30 6\" 30      6\" 20   Heel slides supine 10\", 5' supine 10s, 5' supine 10x, 5' supine 10x, 5'   supine 10\", 5' supine 10\", 5' supine 10\", 5' supine 10\", 5'   Seated knee extension stretching       3', 10\" H heel on stool  3', 10\" H heel on stool  3', 10\" H heel on stool     pball leg curls             SLR       30 standing 30 " "standing 30 standing    SB stretch :20x5  20\"x5    :20x5 :20x5 :20x5 :20x5   Stairs 6\"  10x                                     Standing knee flexion 30x #3 30x #4 4# x30 4# x30   30x #2 5 30x #3 30x #3 30x #3   LAQ to HS curl pause at end range 20x 20x x20    2x10 2x10 2x10 2x10   Prone hang 2x2' 2x2' 2x2' 2x2'     nv 3x1'   Knee extension machine    #22 30x         Knee flexion machine    #22 30x         stairs    6\" 5x         Ther Activity                                       Gait Training             2 laps                          Modalities                                            "

## 2023-06-22 ENCOUNTER — OFFICE VISIT (OUTPATIENT)
Dept: PHYSICAL THERAPY | Facility: CLINIC | Age: 63
End: 2023-06-22
Payer: COMMERCIAL

## 2023-06-22 DIAGNOSIS — M17.11 PRIMARY OSTEOARTHRITIS OF RIGHT KNEE: Primary | ICD-10-CM

## 2023-06-22 PROCEDURE — 97110 THERAPEUTIC EXERCISES: CPT | Performed by: PHYSICAL THERAPIST

## 2023-06-22 PROCEDURE — 97140 MANUAL THERAPY 1/> REGIONS: CPT | Performed by: PHYSICAL THERAPIST

## 2023-06-22 NOTE — PROGRESS NOTES
"Daily Note     Today's date: 2023  Patient name: Yahir Jaramillo  : 1960  MRN: [id number]  Referring provider: Trent Abraham MD  Dx:   Encounter Diagnosis     ICD-10-CM    1  Primary osteoarthritis of right knee  M17 11                      Subjective: knee is doing well, she is walking up to a mile      Objective: See treatment diary below      Assessment: Tolerated treatment well  Patient exhibited good technique with therapeutic exercises and would benefit from continued PT  PROM 1-108  Encouraged consistent stretching at home  Improved step downs and able to use 8\" with slight comensation      Plan: Progress treatment as tolerated         Precautions: none    Manuals    Right knee manual stretching SY SY LM SY SY  SY SY SY SY                Nu step at start 5' seat 7 to start 5' seat to 7 5' seat to 7       5' seat 7 to start   Bike 8' at end 8' at end defers 8' at end 8' at end  Rocking,  7' 8' full 8' full 10' at end   Neuro Re-Ed                                                                                                        Ther Ex             Quad sets       :05x5' with heel prop :05x5' with heel prop :05x5' with heel prop :05x5' with heel prop   Heel raises       #2 5 30 #3 30 #3 30 #3 30   Standing hip 3-way #3 30 #4 30 4# 30 ea 4# 30 ea 4# 30 ea  #2 5 30 #3 30 #3 30 #3 30                Side stepping #3 2' #4 30 4# 2' #4 2' #4 2'  #2 5 2' #2 5 2' #2 5 2' #2 5 2'   Mini squats 30 30 30 30 30  30x 30x 30x 30   Step ups 8\" 30 8\" 30 8\" 30 8\" 30 8\" 30  4\" 30 6\" 30 6\" 30 8\" 30   Step downs 6\" 30 6\" 30 6\" 30 6\" 30 8\" 20     6\" 20   Heel slides supine 10\", 5' supine 10s, 5' supine 10x, 5' supine 10x, 5' supine 10x, 5'  supine 10\", 5' supine 10\", 5' supine 10\", 5' supine 10\", 5'   Seated knee extension stretching       3', 10\" H heel on stool  3', 10\" H heel on stool  3', 10\" H heel on stool     pball leg curls             SLR       30 standing 30 " "standing 30 standing    SB stretch :20x5  20\"x5    :20x5 :20x5 :20x5 :20x5   Stairs 6\"  10x                                     Standing knee flexion 30x #3 30x #4 4# x30 4# x30 4# x30  30x #2 5 30x #3 30x #3 30x #3   LAQ to HS curl pause at end range 20x 20x x20    2x10 2x10 2x10 2x10   Prone hang 2x2' 2x2' 2x2' 2x2' 2x3'    nv 3x1'   Knee extension machine    #22 30x #22 30x        Knee flexion machine    #22 30x #22 30x        stairs    6\" 5x         Ther Activity                                       Gait Training             2 laps                          Modalities                                            "

## 2023-06-26 ENCOUNTER — OFFICE VISIT (OUTPATIENT)
Dept: PHYSICAL THERAPY | Facility: CLINIC | Age: 63
End: 2023-06-26
Payer: COMMERCIAL

## 2023-06-26 DIAGNOSIS — M17.11 PRIMARY OSTEOARTHRITIS OF RIGHT KNEE: Primary | ICD-10-CM

## 2023-06-26 PROCEDURE — 97112 NEUROMUSCULAR REEDUCATION: CPT | Performed by: PHYSICAL THERAPIST

## 2023-06-26 PROCEDURE — 97110 THERAPEUTIC EXERCISES: CPT | Performed by: PHYSICAL THERAPIST

## 2023-06-26 PROCEDURE — 97140 MANUAL THERAPY 1/> REGIONS: CPT | Performed by: PHYSICAL THERAPIST

## 2023-06-26 NOTE — PROGRESS NOTES
"Daily Note     Today's date: 2023  Patient name: Farhan Coffman  : 1960  MRN: [id number]  Referring provider: Rebecca Bowden MD  Dx:   Encounter Diagnosis     ICD-10-CM    1  Primary osteoarthritis of right knee  M17 11                  Subjective: some soreness all around the knee, not related to a specific activity      Objective: See treatment diary below      Assessment: Tolerated treatment well  Patient exhibited good technique with therapeutic exercises and would benefit from continued PT  PROM flexion continues to improved with final measurement at 110 after all stretches  PROM extension to 0 following prone hang      Plan: Progress treatment as tolerated         Precautions: none    Manuals        Right knee manual stretching SY SY LM SY SY SY                    Nu step at start 5' seat 7 to start 5' seat to 7 5' seat to 7          Bike 8' at end 8' at end defers 8' at end 8' at end 8' at end       Neuro Re-Ed                                                                                                        Ther Ex             Quad sets             Heel raises             Standing hip 3-way #3 30 #4 30 4# 30 ea 4# 30 ea 4# 30 ea 4# 30 ea                    Side stepping #3 2' #4 30 4# 2' #4 2' #4 2' #4 2'       Mini squats 30 30 30 30 30 30       Step ups 8\" 30 8\" 30 8\" 30 8\" 30 8\" 30 8\" 30       Step downs 6\" 30 6\" 30 6\" 30 6\" 30 8\" 20 8\" 20       Heel slides supine 10\", 5' supine 10s, 5' supine 10x, 5' supine 10x, 5' supine 10x, 5' supine 10x, 5'       Seated knee extension stretching             pball leg curls             SLR             SB stretch :20x5  20\"x5          Stairs 6\"  10x                                     Standing knee flexion 30x #3 30x #4 4# x30 4# x30 4# x30 4# x30       LAQ to HS curl pause at end range 20x 20x x20          Prone hang 2x2' 2x2' 2x2' 2x2' 2x3' 2x3'       Knee extension machine    #22 30x #22 30x #22 30x       Knee flexion " "machine    #22 30x #22 30x #22 30x       stairs    6\" 5x         Ther Activity                                       Gait Training             2 laps                          Modalities                                            "

## 2023-06-29 ENCOUNTER — OFFICE VISIT (OUTPATIENT)
Dept: PHYSICAL THERAPY | Facility: CLINIC | Age: 63
End: 2023-06-29
Payer: COMMERCIAL

## 2023-06-29 DIAGNOSIS — M17.11 PRIMARY OSTEOARTHRITIS OF RIGHT KNEE: Primary | ICD-10-CM

## 2023-06-29 PROCEDURE — 97110 THERAPEUTIC EXERCISES: CPT | Performed by: PHYSICAL THERAPIST

## 2023-06-29 PROCEDURE — 97112 NEUROMUSCULAR REEDUCATION: CPT | Performed by: PHYSICAL THERAPIST

## 2023-06-29 PROCEDURE — 97140 MANUAL THERAPY 1/> REGIONS: CPT | Performed by: PHYSICAL THERAPIST

## 2023-06-29 NOTE — PROGRESS NOTES
"Daily Note     Today's date: 2023  Patient name: Alex Flynn  : 1960  MRN: [id number]  Referring provider: Santy Delgadillo MD  Dx:   Encounter Diagnosis     ICD-10-CM    1  Primary osteoarthritis of right knee  M17 11           Start Time: 1108          Subjective: some anterior knee soreness when walking up a hill but otherwise doing ok ; drove to this appointment for the first time today      Objective: See treatment diary below      Assessment: Tolerated treatment well  Patient exhibited good technique with therapeutic exercises and would benefit from continued PT  PROM 0-112 by end of stretching; ROM continues to improve slowly and she is able to ascend and descend an 8\" step with minimal compensation  Plan: Progress treatment as tolerated         Precautions: none    Manuals       Right knee manual stretching SY SY LM SY SY SY SY                   Nu step at start 5' seat 7 to start 5' seat to 7 5' seat to 7          Bike 8' at end 8' at end defers 8' at end 8' at end 8' at end 8' at end      Neuro Re-Ed                                                                                                        Ther Ex             Quad sets             Heel raises             Standing hip 3-way #3 30 #4 30 4# 30 ea 4# 30 ea 4# 30 ea 4# 30 ea 4# 30 ea                   Side stepping #3 2' #4 30 4# 2' #4 2' #4 2' #4 2' #4 2'      Mini squats 30 30 30 30 30 30 30      Step ups 8\" 30 8\" 30 8\" 30 8\" 30 8\" 30 8\" 30 8\" 30      Step downs 6\" 30 6\" 30 6\" 30 6\" 30 8\" 20 8\" 20 8\" 30      Heel slides supine 10\", 5' supine 10s, 5' supine 10x, 5' supine 10x, 5' supine 10x, 5' supine 10x, 5' supine 10x, 5'      Seated knee extension stretching             pball leg curls             SLR             SB stretch :20x5  20\"x5          Stairs 6\"  10x                                     Standing knee flexion 30x #3 30x #4 4# x30 4# x30 4# x30 4# x30 4# x30      LAQ to HS curl pause " "at end range 20x 20x x20          Prone hang 2x2' 2x2' 2x2' 2x2' 2x3' 2x3' 2x3'      Knee extension machine    #22 30x #22 30x #22 30x #22 30x      Knee flexion machine    #22 30x #22 30x #22 30x #22 30x      stairs    6\" 5x         Ther Activity                                       Gait Training             2 laps                          Modalities                                            "

## 2023-07-03 ENCOUNTER — OFFICE VISIT (OUTPATIENT)
Dept: PHYSICAL THERAPY | Facility: CLINIC | Age: 63
End: 2023-07-03
Payer: COMMERCIAL

## 2023-07-03 DIAGNOSIS — M17.11 PRIMARY OSTEOARTHRITIS OF RIGHT KNEE: Primary | ICD-10-CM

## 2023-07-03 PROCEDURE — 97530 THERAPEUTIC ACTIVITIES: CPT

## 2023-07-03 PROCEDURE — 97112 NEUROMUSCULAR REEDUCATION: CPT

## 2023-07-03 PROCEDURE — 97140 MANUAL THERAPY 1/> REGIONS: CPT

## 2023-07-03 PROCEDURE — 97110 THERAPEUTIC EXERCISES: CPT

## 2023-07-03 NOTE — PROGRESS NOTES
Daily Note     Today's date: 7/3/2023  Patient name: Martín Correa  : 1960  MRN: [id number]  Referring provider: Buster Cooley MD  Dx:   Encounter Diagnosis     ICD-10-CM    1. Primary osteoarthritis of right knee  M17.11                      Subjective: Patient reports no lingering soreness after last visit and she feels some stiffness in her R knee at start of session. Objective: See treatment diary below      Assessment: Tolerated treatment well. Patient demonstrated fatigue post treatment, exhibited good technique with therapeutic exercises and would benefit from continued PT. PROM knee flexion to 114 post manual stretching. Patient able to perform knee extensions with only RLE for most reps. No R knee pain with exercises performed. Plan: Progress treatment as tolerated.        Precautions: none    Manuals  7/3     Right knee manual stretching SY SY LM SY SY SY SY MC                  Nu step at start 5' seat 7 to start 5' seat to 7 5' seat to 7          Bike 8' at end 8' at end defers 8' at end 8' at end 8' at end 8' at end 8' at start     Neuro Re-Ed                                                                                                        Ther Ex             Quad sets             Heel raises             Standing hip 3-way #3 30 #4 30 4# 30 ea 4# 30 ea 4# 30 ea 4# 30 ea 4# 30 ea 4# 30 ea                  Side stepping #3 2' #4 30 4# 2' #4 2' #4 2' #4 2' #4 2' 4# 2'     Mini squats 30 30 30 30 30 30 30 30     Step ups 8" 30 8" 30 8" 30 8" 30 8" 30 8" 30 8" 30 8" 30      Step downs 6" 30 6" 30 6" 30 6" 30 8" 20 8" 20 8" 30 8" 30     Heel slides supine 10", 5' supine 10s, 5' supine 10x, 5' supine 10x, 5' supine 10x, 5' supine 10x, 5' supine 10x, 5' Supine :10x5'     Seated knee extension stretching             pball leg curls             SLR             SB stretch :20x5  20"x5          Stairs 6"  10x Standing knee flexion 30x #3 30x #4 4# x30 4# x30 4# x30 4# x30 4# x30 4# 30     LAQ to HS curl pause at end range 20x 20x x20          Prone hang 2x2' 2x2' 2x2' 2x2' 2x3' 2x3' 2x3' 2x3'     Knee extension machine    #22 30x #22 30x #22 30x #22 30x 22# 30x mostly single leg     Knee flexion machine    #22 30x #22 30x #22 30x #22 30x 22# 30x      stairs    6" 5x         Ther Activity                                       Gait Training             2 laps                          Modalities

## 2023-07-06 ENCOUNTER — OFFICE VISIT (OUTPATIENT)
Dept: PHYSICAL THERAPY | Facility: CLINIC | Age: 63
End: 2023-07-06
Payer: COMMERCIAL

## 2023-07-06 DIAGNOSIS — M17.11 PRIMARY OSTEOARTHRITIS OF RIGHT KNEE: Primary | ICD-10-CM

## 2023-07-06 PROCEDURE — 97110 THERAPEUTIC EXERCISES: CPT | Performed by: PHYSICAL THERAPIST

## 2023-07-06 PROCEDURE — 97140 MANUAL THERAPY 1/> REGIONS: CPT | Performed by: PHYSICAL THERAPIST

## 2023-07-06 PROCEDURE — 97112 NEUROMUSCULAR REEDUCATION: CPT | Performed by: PHYSICAL THERAPIST

## 2023-07-06 NOTE — PROGRESS NOTES
Daily Note     Today's date: 2023  Patient name: Meet Pepe  : 1960  MRN: [id number]  Referring provider: Stanislaw Cowart MD  Dx:   Encounter Diagnosis     ICD-10-CM    1. Primary osteoarthritis of right knee  M17.11                      Subjective: she is doing up and down stairs with a reciprocal pattern but uses bilateral UEs. Doing well with ADLs. Objective: See treatment diary below      Assessment: Tolerated treatment well. Patient exhibited good technique with therapeutic exercises and would benefit from continued PT. Normal gait pattern, good tolerance to manual stretching, PROM flexion 117      Plan: Progress treatment as tolerated.        Precautions: none    Manuals 6/8 6/12 6/14 6/19 6/22 6/26 6/29 7/3 7/6    Right knee manual stretching SY SY LM SY SY SY SY MC SY                 Nu step at start 5' seat 7 to start 5' seat to 7 5' seat to 7          Bike 8' at end 8' at end defers 8' at end 8' at end 8' at end 8' at end 8' at start 8' at start    Neuro Re-Ed                                                                                                        Ther Ex             Quad sets             Heel raises             Standing hip 3-way #3 30 #4 30 4# 30 ea 4# 30 ea 4# 30 ea 4# 30 ea 4# 30 ea 4# 30 ea #5 30ea                 Side stepping #3 2' #4 30 4# 2' #4 2' #4 2' #4 2' #4 2' 4# 2' 4# 2'    Mini squats 30 30 30 30 30 30 30 30 30    Step ups 8" 30 8" 30 8" 30 8" 30 8" 30 8" 30 8" 30 8" 30  8" 30     Step downs 6" 30 6" 30 6" 30 6" 30 8" 20 8" 20 8" 30 8" 30 8" 30     Heel slides supine 10", 5' supine 10s, 5' supine 10x, 5' supine 10x, 5' supine 10x, 5' supine 10x, 5' supine 10x, 5' Supine :10x5' Supine :10x5'    Seated knee extension stretching             pball leg curls             SLR             SB stretch :20x5  20"x5          Stairs 6"  10x                                     Standing knee flexion 30x #3 30x #4 4# x30 4# x30 4# x30 4# x30 4# x30 4# 30 5# 30    LAQ to HS curl pause at end range 20x 20x x20          Prone hang 2x2' 2x2' 2x2' 2x2' 2x3' 2x3' 2x3' 2x3' 2x3'    Knee extension machine    #22 30x #22 30x #22 30x #22 30x 22# 30x mostly single leg 22# 30x mostly single leg    Knee flexion machine    #22 30x #22 30x #22 30x #22 30x 22# 30x  22# 30x    stairs    6" 5x         Ther Activity                                       Gait Training             2 laps                          Modalities

## 2023-07-10 ENCOUNTER — OFFICE VISIT (OUTPATIENT)
Dept: PHYSICAL THERAPY | Facility: CLINIC | Age: 63
End: 2023-07-10
Payer: COMMERCIAL

## 2023-07-10 DIAGNOSIS — M17.11 PRIMARY OSTEOARTHRITIS OF RIGHT KNEE: Primary | ICD-10-CM

## 2023-07-10 PROCEDURE — 97112 NEUROMUSCULAR REEDUCATION: CPT | Performed by: PHYSICAL THERAPIST

## 2023-07-10 PROCEDURE — 97140 MANUAL THERAPY 1/> REGIONS: CPT | Performed by: PHYSICAL THERAPIST

## 2023-07-10 PROCEDURE — 97110 THERAPEUTIC EXERCISES: CPT | Performed by: PHYSICAL THERAPIST

## 2023-07-10 NOTE — PROGRESS NOTES
Daily Note     Today's date: 7/10/2023  Patient name: Ramirez Goins  : 1960  MRN: [id number]  Referring provider: Amol Stuart MD  Dx:   Encounter Diagnosis     ICD-10-CM    1. Primary osteoarthritis of right knee  M17.11                    Subjective: no issues since last visit; she has been stretching consistently and going on walks       Objective: See treatment diary below      Assessment: Tolerated treatment well. Patient exhibited good technique with therapeutic exercises and would benefit from continued PT. PROM 0-118 at end of stretching, reviewed lunges      Plan: Progress treatment as tolerated.        Precautions: none    Manuals 6/8 6/12 6/14 6/19 6/22 6/26 6/29 7/3 7/6 7/10   Right knee manual stretching SY SY LM SY SY SY SY MC SY SY                Nu step at start 5' seat 7 to start 5' seat to 7 5' seat to 7          Bike 8' at end 8' at end defers 8' at end 8' at end 8' at end 8' at end 8' at start 8' at start 8' at start   Neuro Re-Ed                                                                                                        Ther Ex             Quad sets             Heel raises             Standing hip 3-way #3 30 #4 30 4# 30 ea 4# 30 ea 4# 30 ea 4# 30 ea 4# 30 ea 4# 30 ea #5 30ea #5 30ea                Side stepping #3 2' #4 30 4# 2' #4 2' #4 2' #4 2' #4 2' 4# 2' 4# 2' 4# 2'   Mini squats 30 30 30 30 30 30 30 30 30 30   Step ups 8" 30 8" 30 8" 30 8" 30 8" 30 8" 30 8" 30 8" 30  8" 30  8" 30   Step downs 6" 30 6" 30 6" 30 6" 30 8" 20 8" 20 8" 30 8" 30 8" 30  8" 30   Heel slides supine 10", 5' supine 10s, 5' supine 10x, 5' supine 10x, 5' supine 10x, 5' supine 10x, 5' supine 10x, 5' Supine :10x5' Supine :10x5' Supine :10x5'   Seated knee extension stretching             pball leg curls             SLR             SB stretch :20x5  20"x5          Stairs 6"  10x                                     Standing knee flexion 30x #3 30x #4 4# x30 4# x30 4# x30 4# x30 4# x30 4# 30 5# 30 nv LAQ to HS curl pause at end range 20x 20x x20          Prone hang 2x2' 2x2' 2x2' 2x2' 2x3' 2x3' 2x3' 2x3' 2x3' 2x3'   Knee extension machine    #22 30x #22 30x #22 30x #22 30x 22# 30x mostly single leg 22# 30x mostly single leg nv   Knee flexion machine    #22 30x #22 30x #22 30x #22 30x 22# 30x  22# 30x nv   stairs    6" 5x         lunges          20   Ther Activity                                       Gait Training             2 laps                          Modalities

## 2023-07-13 ENCOUNTER — OFFICE VISIT (OUTPATIENT)
Dept: PHYSICAL THERAPY | Facility: CLINIC | Age: 63
End: 2023-07-13
Payer: COMMERCIAL

## 2023-07-13 DIAGNOSIS — M17.11 PRIMARY OSTEOARTHRITIS OF RIGHT KNEE: Primary | ICD-10-CM

## 2023-07-13 PROCEDURE — 97110 THERAPEUTIC EXERCISES: CPT | Performed by: PHYSICAL THERAPIST

## 2023-07-13 PROCEDURE — 97140 MANUAL THERAPY 1/> REGIONS: CPT | Performed by: PHYSICAL THERAPIST

## 2023-07-13 PROCEDURE — 97112 NEUROMUSCULAR REEDUCATION: CPT | Performed by: PHYSICAL THERAPIST

## 2023-07-13 NOTE — PROGRESS NOTES
Daily Note     Today's date: 2023  Patient name: Francia Ng  : 1960  MRN: [id number]  Referring provider: Jesus Hall MD  Dx:   Encounter Diagnosis     ICD-10-CM    1. Primary osteoarthritis of right knee  M17.11                      Subjective: notes her knee is doing well, has vacation next [place] state but will continue stretches      Objective: See treatment diary below      Assessment: Tolerated treatment well. Patient exhibited good technique with therapeutic exercises and would benefit from continued PT. PROM knee flexion 118, goal of 120 prior to next surgeons visit after she returns from vacation. Plan: Progress treatment as tolerated.        Precautions: none    Manuals 7/13      6/29 7/3 7/6 7/10   Right knee manual stretching SY      SY MC SY SY                Nu step at start             Bike 8' at start      8' at end 8' at start 8' at start 8' at start   Neuro Re-Ed                                                                                                        Ther Ex             Quad sets             Heel raises             Standing hip 3-way #5 30ea      4# 30 ea 4# 30 ea #5 30ea #5 30ea                Side stepping #5x2'      #4 2' 4# 2' 4# 2' 4# 2'   Mini squats 30      30 30 30 30   Step ups       8" 30 8" 30  8" 30  8" 30   Step downs       8" 30 8" 30 8" 30  8" 30   Heel slides Supine :10x5'      supine 10x, 5' Supine :10x5' Supine :10x5' Supine :10x5'   Seated knee extension stretching             pball leg curls             SLR             SB stretch             Stairs 6"                                       Standing knee flexion #5 30      4# x30 4# 30 5# 30 nv   LAQ to HS curl pause at end range             Prone hang 2x3'      2x3' 2x3' 2x3' 2x3'   Knee extension machine #33 30x      #22 30x 22# 30x mostly single leg 22# 30x mostly single leg nv   Knee flexion machine #22 30x      #22 30x 22# 30x  22# 30x nv   stairs             lunges 20         20 Ther Activity                                       Gait Training             2 laps                          Modalities

## 2023-07-24 ENCOUNTER — OFFICE VISIT (OUTPATIENT)
Dept: PHYSICAL THERAPY | Facility: CLINIC | Age: 63
End: 2023-07-24
Payer: COMMERCIAL

## 2023-07-24 DIAGNOSIS — M17.11 PRIMARY OSTEOARTHRITIS OF RIGHT KNEE: Primary | ICD-10-CM

## 2023-07-24 PROCEDURE — 97112 NEUROMUSCULAR REEDUCATION: CPT | Performed by: PHYSICAL THERAPIST

## 2023-07-24 PROCEDURE — 97140 MANUAL THERAPY 1/> REGIONS: CPT | Performed by: PHYSICAL THERAPIST

## 2023-07-24 PROCEDURE — 97110 THERAPEUTIC EXERCISES: CPT | Performed by: PHYSICAL THERAPIST

## 2023-07-24 NOTE — PROGRESS NOTES
PT RE-EVALUATION     Today's date: 23  Patient name: Swetha Escalona  LP  DMF: [id number]  Referring provider: Nga Alan,*  Dx:   1. Primary osteoarthritis of right knee          Swetha Escalona is a 70 y. o. female who is recovering well from right TKA on 23.  Her ROM has significantly improved and her flexion consistently increased. Good strength in the right LE with no difficulty with ascending stairs. This patient would benefit from skilled physical therapy following their surgery to address their listed impairments and functional limitations to maximize functional outcome.     Impairments:    restricted ROM    decreased strength   pain with function   activity intolerance   weight bearing intolerance      Prognosis:  Good  Positive and negative prognostic indicator(s):  pain >3 months     Goals:    STG Patient is independent with HEP  (met)  STG Range of motion is improved by 25% in 2 weeks (met)  LTG Range of motion is improved by 50% in 4 weeks (partially met)  LTG Increase strength full grade in 4 weeks  LTG ADL performance improved to prior level of function in 6 weeks  LTG Weight bearing tolerance improved 50% in 4 weeks     Planned interventions:  home exercise program, patient education, manual therapy, graded activity, flexibility, functional range of motion exercises and strengthening     Duration in visits:  8  Frequency: 2 visits per week  Duration in weeks:  4     History of Current Injury:   23 post op right TKA. She is no longer using an assistive device. She was on vacation out of state last week and able to walk on the beach without difficulty. She is ascending stairs normally. No difficulty with house work.  Stiffness varies day to day and is not related to level activity.     Pain location: anterior lower leg, posterior knee  Pain descriptors:  stiffness, some twinges medially   Pain intensity: 0-4     Aggravating factors: all weight bearing activities  Easing factors: brace for stability     Patient goals:  decreased pain, independence with ADLs, increased mobility and return to recreation     Objective      Active Range of Motion      Right Knee   Flexion: 98 (from  82,  60 degrees)   Extensor la (from 12, 45 degrees)     Passive Range of Motion      Right Knee   Flexion: 120 (from 100 degrees)   Extension: ) (from 3)      Strength/Myotome Testing      Right Knee   Flexion: 4+  Extension: 4+     Inspection: color/temp wnl         Daily Note     Today's date: 2023  Patient name: Sarika Reyes  : 1960  MRN: [id number]  Referring provider: Munir Coyle MD  Dx:   Encounter Diagnosis     ICD-10-CM    1.  Primary osteoarthritis of right knee  M17.11                       Precautions: none    Manuals 7/13 7/24     6/29 7/3 7/6 7/10   Right knee manual stretching SY SY     SY MC SY SY                Nu step at start             Bike 8' at start lvl 5 8'     8' at end 8' at start 8' at start 8' at start   Neuro Re-Ed                                                                                                        Ther Ex             Quad sets             Heel raises             Standing hip 3-way #5 30ea #5 30ea     4# 30 ea 4# 30 ea #5 30ea #5 30ea                Side stepping #5x2' #5x2'     #4 2' 4# 2' 4# 2' 4# 2'   Mini squats 30 30     30 30 30 30   Step ups       8" 30 8" 30  8" 30  8" 30   Step downs       8" 30 8" 30 8" 30  8" 30   Heel slides Supine :10x5' Supine :10x5'     supine 10x, 5' Supine :10x5' Supine :10x5' Supine :10x5'   Seated knee extension stretching             pball leg curls             SLR             SB stretch             Stairs 6"                                       Standing knee flexion #5 30 #5 30     4# x30 4# 30 5# 30 nv   LAQ to HS curl pause at end range             Prone hang 2x3' 2x3'     2x3' 2x3' 2x3' 2x3'   Knee extension machine #33 30x #33 30x     #22 30x 22# 30x mostly single leg 22# 30x mostly single leg nv   Knee flexion machine #22 30x #33 30x     #22 30x 22# 30x  22# 30x nv   stairs             lunges 20 20        20   Ther Activity                                       Gait Training             2 laps                          Modalities

## 2023-07-25 ENCOUNTER — HOSPITAL ENCOUNTER (OUTPATIENT)
Dept: RADIOLOGY | Facility: HOSPITAL | Age: 63
Discharge: HOME | End: 2023-07-25
Attending: ORTHOPAEDIC SURGERY
Payer: COMMERCIAL

## 2023-07-25 ENCOUNTER — OFFICE VISIT (OUTPATIENT)
Dept: OBGYN CLINIC | Facility: HOSPITAL | Age: 63
End: 2023-07-25

## 2023-07-25 VITALS
DIASTOLIC BLOOD PRESSURE: 81 MMHG | HEIGHT: 63 IN | HEART RATE: 85 BPM | SYSTOLIC BLOOD PRESSURE: 126 MMHG | BODY MASS INDEX: 30.11 KG/M2

## 2023-07-25 DIAGNOSIS — Z47.1 AFTERCARE FOLLOWING RIGHT KNEE JOINT REPLACEMENT SURGERY: Primary | ICD-10-CM

## 2023-07-25 DIAGNOSIS — Z47.1 AFTERCARE FOLLOWING RIGHT KNEE JOINT REPLACEMENT SURGERY: ICD-10-CM

## 2023-07-25 DIAGNOSIS — Z96.651 AFTERCARE FOLLOWING RIGHT KNEE JOINT REPLACEMENT SURGERY: ICD-10-CM

## 2023-07-25 DIAGNOSIS — Z96.651 AFTERCARE FOLLOWING RIGHT KNEE JOINT REPLACEMENT SURGERY: Primary | ICD-10-CM

## 2023-07-25 PROCEDURE — 99024 POSTOP FOLLOW-UP VISIT: CPT | Performed by: ORTHOPAEDIC SURGERY

## 2023-07-25 PROCEDURE — 73560 X-RAY EXAM OF KNEE 1 OR 2: CPT

## 2023-07-25 RX ORDER — CEPHALEXIN 500 MG/1
CAPSULE ORAL
Qty: 40 CAPSULE | Refills: 0 | Status: SHIPPED | OUTPATIENT
Start: 2023-07-25 | End: 2023-07-25

## 2023-07-25 NOTE — PROGRESS NOTES
Assessment:  1. Aftercare following right knee joint replacement surgery  XR knee 1 or 2 vw right          Plan:  3 months s/p right TKA with robot, 5/1/2023. She is doing well. She is encouraged to remain active including HEP. The patient is counseled on prophylactic antibiotic use prior to dental visits. Antibotics were prescribed. She should follow up in 3 months      To do next visit:  Return in about 3 months (around 10/25/2023) for re-check with x-rays. The above stated was discussed in layman's terms and the patient expressed understanding. All questions were answered to the patient's satisfaction. Scribe Attestation    I,:  Kym Dechristopher am acting as a scribe while in the presence of the attending physician.:       I,:  Charli Tan MD personally performed the services described in this documentation    as scribed in my presence.:             Subjective:   Dionicio Sanders is a 70 y.o. female who presents 3 months s/p right TKA with robot, 5/1/2023. She is doing well. Today she has no right knee pain complaints  She continues physical therapy. She denies medications for this issue. He denies fever, chills or shortness of breath.       Review of systems negative unless otherwise specified in HPI    Past Medical History:   Diagnosis Date   • Disease of thyroid gland    • GERD (gastroesophageal reflux disease)        Past Surgical History:   Procedure Laterality Date   • ANTERIOR CRUCIATE LIGAMENT REPAIR Bilateral    • COLONOSCOPY  2017    normal   • JOINT REPLACEMENT     • [place] ARTHRP KNE CONDYLE&PLATU MEDIAL&LAT COMPARTMENTS Right 5/1/2023    Procedure: ARTHROPLASTY KNEE TOTAL W ROBOT;  Surgeon: Charli Tan MD;  Location: [facility];  Service: Orthopedics       Family History   Problem Relation Age of Onset   • No Known Problems Mother    • Prostate cancer Father         62s or 76s   • Lymphoma Sister    • No Known Problems Sister    • No Known Problems Sister    • No Known Problems Daughter    • Breast cancer Maternal Grandmother 48   • No Known Problems Maternal Grandfather    • Breast cancer Paternal Grandmother 61        in her 62s   • No Known Problems Paternal Grandfather    • No Known Problems Paternal Aunt        Social History     Occupational History   • Not on file   Tobacco Use   • Smoking status: Never   • Smokeless tobacco: Never   Vaping Use   • Vaping Use: Never used   Substance and Sexual Activity   • Alcohol use:  Yes     Alcohol/week: 1.0 standard drink of alcohol     Types: 1 Glasses of wine per week     Comment: occassional   • Drug use: Never   • Sexual activity: Yes     Partners: Male         Current Outpatient Medications:   •  cholecalciferol (VITAMIN D3) 25 mcg (1,000 units) tablet, , Disp: , Rfl:   •  [employer] Covid-19 At Home Test Kit KIT, FOLLOW INSTRUCTIONS INCLUDED WITH THE PACKAGE., Disp: , Rfl:   •  levothyroxine 75 mcg tablet, Take 75 mcg by mouth daily, Disp: , Rfl:   •  multivitamin (THERAGRAN) TABS, Take 1 tablet by mouth daily, Disp: , Rfl:   •  omeprazole (PriLOSEC) 40 MG capsule, Take 40 mg by mouth daily, Disp: , Rfl:   •  Rhubarb (ESTROVEN COMPLETE PO), Take by mouth in the morning, Disp: , Rfl:     Allergies   Allergen Reactions   • Dilaudid [Hydromorphone] Vomiting     Okay to give oxy    • Morphine Nausea Only   • Sulfa Antibiotics Hives   • Sulfacetamide Sodium-Sulfur Rash            Vitals:    07/25/23 1337   BP: 126/81   Pulse: 85       Objective:  Physical exam  · General: Awake, Alert, Oriented  · Eyes: Pupils equal, round and reactive to light  · Heart: regular rate and rhythm  · Lungs: No audible wheezing  · Abdomen: soft                    Ortho Exam  Right knee:  Well healed anterior incision  No erythema and no ecchymosis  Appropriate swelling of lower limb  Appropriate warmth of knee  Extensor mechanism intact  Extension full  Flexion 120  Calf compartments soft and supple  Sensation intact  Toes are warm sensate and mobile      Diagnostics, reviewed and taken today if performed as documented: The attending physician has personally reviewed the pertinent films in PACS and interpretation is as follows:  Right knee x-ray:  Well aligned prosthesis with no acute changes. Procedures, if performed today:    Procedures    None performed      Portions of the record may have been created with voice recognition software. Occasional wrong word or "sound a like" substitutions may have occurred due to the inherent limitations of voice recognition software. Read the chart carefully and recognize, using context, where substitutions have occurred.

## 2023-07-27 ENCOUNTER — OFFICE VISIT (OUTPATIENT)
Dept: PHYSICAL THERAPY | Facility: CLINIC | Age: 63
End: 2023-07-27
Payer: COMMERCIAL

## 2023-07-27 DIAGNOSIS — M17.11 PRIMARY OSTEOARTHRITIS OF RIGHT KNEE: Primary | ICD-10-CM

## 2023-07-27 PROCEDURE — 97110 THERAPEUTIC EXERCISES: CPT | Performed by: PHYSICAL THERAPIST

## 2023-07-27 PROCEDURE — 97112 NEUROMUSCULAR REEDUCATION: CPT | Performed by: PHYSICAL THERAPIST

## 2023-07-27 PROCEDURE — 97140 MANUAL THERAPY 1/> REGIONS: CPT | Performed by: PHYSICAL THERAPIST

## 2023-07-27 NOTE — PROGRESS NOTES
Daily Note     Today's date: 2023  Patient name: Arina Ornelas  : 1960  MRN: [id number]  Referring provider: Francoise Nguyen MD  Dx:   Encounter Diagnosis     ICD-10-CM    1. Primary osteoarthritis of right knee  M17.11                  Subjective: returned to surgeon who was pleased with progress, discharge from PT next week      Objective: See treatment diary below      Assessment: Tolerated treatment well. Patient exhibited good technique with therapeutic exercises and would benefit from continued PT. PROM doing well with end ROM 0-121 after stretching       Plan: Progress treatment as tolerated.        Precautions: none    Manuals 7/13 7/24 7/27    6/29 7/3 7/6 7/10   Right knee manual stretching SY SY SY    SY MC SY SY                Nu step at start             Bike 8' at start lvl 5 8' lvl 5 8'    8' at end 8' at start 8' at start 8' at start   Neuro Re-Ed                                                                                                        Ther Ex             Quad sets             Heel raises             Standing hip 3-way #5 30ea #5 30ea #6 30ea    4# 30 ea 4# 30 ea #5 30ea #5 30ea                Side stepping #5x2' #5x2' #6x2'    #4 2' 4# 2' 4# 2' 4# 2'   Mini squats 30 30 30    30 30 30 30   Step ups       8" 30 8" 30  8" 30  8" 30   Step downs       8" 30 8" 30 8" 30  8" 30   Heel slides Supine :10x5' Supine :10x5' Supine :10x5'    supine 10x, 5' Supine :10x5' Supine :10x5' Supine :10x5'   Seated knee extension stretching             pball leg curls             SLR             SB stretch             Stairs 6"                                       Standing knee flexion #5 30 #5 30 #6 30    4# x30 4# 30 5# 30 nv   LAQ to HS curl pause at end range             Prone hang 2x3' 2x3' 2x3'    2x3' 2x3' 2x3' 2x3'   Knee extension machine #33 30x #33 30x #33 30x    #22 30x 22# 30x mostly single leg 22# 30x mostly single leg nv   Knee flexion machine #22 30x #33 30x #33 30x    #22 30x 22# 30x  22# 30x nv   stairs             lunges 20 20 20       20   Ther Activity                                       Gait Training             2 laps                          Modalities

## 2023-07-31 ENCOUNTER — OFFICE VISIT (OUTPATIENT)
Dept: PHYSICAL THERAPY | Facility: CLINIC | Age: 63
End: 2023-07-31
Payer: COMMERCIAL

## 2023-07-31 DIAGNOSIS — M17.11 PRIMARY OSTEOARTHRITIS OF RIGHT KNEE: Primary | ICD-10-CM

## 2023-07-31 PROCEDURE — 97112 NEUROMUSCULAR REEDUCATION: CPT | Performed by: PHYSICAL THERAPIST

## 2023-07-31 PROCEDURE — 97110 THERAPEUTIC EXERCISES: CPT | Performed by: PHYSICAL THERAPIST

## 2023-07-31 PROCEDURE — 97140 MANUAL THERAPY 1/> REGIONS: CPT | Performed by: PHYSICAL THERAPIST

## 2023-07-31 NOTE — PROGRESS NOTES
Daily Note     Today's date: 2023  Patient name: Aleksandra Groves  : 1960  MRN: [id number]  Referring provider: Pineda Vergara MD  Dx:   Encounter Diagnosis     ICD-10-CM    1. Primary osteoarthritis of right knee  M17.11                      Subjective: stairs have been better, now doing up and down stairs with a reciprocal pattern, some difficulty descending      Objective: See treatment diary below      Assessment: Tolerated treatment well. Patient exhibited good technique with therapeutic exercises and would benefit from continued PT. PROM 0-123; no compensation with squats, lunges or step ups      Plan: Progress treatment as tolerated.        Precautions: none    Manuals 7/13 7/24 7/27 7/31   6/29 7/3 7/6 7/10   Right knee manual stretching SY SY SY SY   SY MC SY SY                Nu step at start             Bike 8' at start lvl 5 8' lvl 5 8' lvl 5 8'   8' at end 8' at start 8' at start 8' at start   Neuro Re-Ed                                                                                                        Ther Ex             Quad sets             Heel raises             Standing hip 3-way #5 30ea #5 30ea #6 30ea #6 30ea   4# 30 ea 4# 30 ea #5 30ea #5 30ea                Side stepping #5x2' #5x2' #6x2' #6x2'   #4 2' 4# 2' 4# 2' 4# 2'   Mini squats 30 30 30 30, #7 DB   30 30 30 30   Step ups       8" 30 8" 30  8" 30  8" 30   Step downs       8" 30 8" 30 8" 30  8" 30   Heel slides Supine :10x5' Supine :10x5' Supine :10x5' Supine :10x5'   supine 10x, 5' Supine :10x5' Supine :10x5' Supine :10x5'   Seated knee extension stretching             pball leg curls             SLR             SB stretch             Stairs 6"                                       Standing knee flexion #5 30 #5 30 #6 30 #6 30   4# x30 4# 30 5# 30 nv   LAQ to HS curl pause at end range             Prone hang 2x3' 2x3' 2x3' 2x3'   2x3' 2x3' 2x3' 2x3'   Knee extension machine #33 30x #33 30x #33 30x #33 30x   #22 30x 22# 30x mostly single leg 22# 30x mostly single leg nv   Knee flexion machine #22 30x #33 30x #33 30x #33 30x   #22 30x 22# 30x  22# 30x nv   stairs             lunges 20 20 20 20 , #7      20   Ther Activity                                       Gait Training             2 laps                          Modalities

## 2023-08-03 ENCOUNTER — OFFICE VISIT (OUTPATIENT)
Dept: PHYSICAL THERAPY | Facility: CLINIC | Age: 63
End: 2023-08-03
Payer: COMMERCIAL

## 2023-08-03 DIAGNOSIS — M17.11 PRIMARY OSTEOARTHRITIS OF RIGHT KNEE: Primary | ICD-10-CM

## 2023-08-03 PROCEDURE — 97110 THERAPEUTIC EXERCISES: CPT | Performed by: PHYSICAL THERAPIST

## 2023-08-03 PROCEDURE — 97112 NEUROMUSCULAR REEDUCATION: CPT | Performed by: PHYSICAL THERAPIST

## 2023-08-03 PROCEDURE — 97140 MANUAL THERAPY 1/> REGIONS: CPT | Performed by: PHYSICAL THERAPIST

## 2023-08-03 NOTE — PROGRESS NOTES
Daily Note     Today's date: 8/3/2023  Patient name: Dionicio Sanders  : 1960  MRN: [id number]  Referring provider: Grupo Velasquez MD  Dx:   Encounter Diagnosis     ICD-10-CM    1. Primary osteoarthritis of right knee  M17.11                      Subjective: knee is doing well, feeling good this week; she went for a long walk yesterday, no issues      Objective: See treatment diary below      Assessment: Tolerated treatment well. Patient exhibited good technique with therapeutic exercises and would benefit from continued PT. No compensation with stairs, normal gait pattern. PROM 0-124      Plan: Progress treatment as tolerated.        Precautions: none    Manuals 7/13 7/24 7/27 7/31 8/3  6/29 7/3 7/6 7/10   Right knee manual stretching SY SY SY SY SY  SY MC SY SY                Nu step at start             Bike 8' at start lvl 5 8' lvl 5 8' lvl 5 8' lvl 5 8'  8' at end 8' at start 8' at start 8' at start   Neuro Re-Ed                                                                                                        Ther Ex             Quad sets             Heel raises             Standing hip 3-way #5 30ea #5 30ea #6 30ea #6 30ea #6 30ea  4# 30 ea 4# 30 ea #5 30ea #5 30ea                Side stepping #5x2' #5x2' #6x2' #6x2' #6x2'  #4 2' 4# 2' 4# 2' 4# 2'   Mini squats 30 30 30 30, #7 DB 30, #7 DB  30 30 30 30   Step ups     8" with #7 DB  8" 30 8" 30  8" 30  8" 30   Step downs       8" 30 8" 30 8" 30  8" 30   Heel slides Supine :10x5' Supine :10x5' Supine :10x5' Supine :10x5' Supine :10x5'  supine 10x, 5' Supine :10x5' Supine :10x5' Supine :10x5'   Seated knee extension stretching             pball leg curls             SLR             SB stretch             Stairs 6"                                       Standing knee flexion #5 30 #5 30 #6 30 #6 30 #6 30  4# x30 4# 30 5# 30 nv   LAQ to HS curl pause at end range             Prone hang 2x3' 2x3' 2x3' 2x3'   2x3' 2x3' 2x3' 2x3'   Knee extension machine #33 30x #33 30x #33 30x #33 30x #33 30x  #22 30x 22# 30x mostly single leg 22# 30x mostly single leg nv   Knee flexion machine #22 30x #33 30x #33 30x #33 30x #33 30x  #22 30x 22# 30x  22# 30x nv   stairs             lunges 20 20 20 20 , #7 20#7     20   Ther Activity                                       Gait Training             2 laps                          Modalities

## 2023-10-23 DIAGNOSIS — Z47.1 AFTERCARE FOLLOWING RIGHT KNEE JOINT REPLACEMENT SURGERY: Primary | ICD-10-CM

## 2023-10-23 DIAGNOSIS — Z96.651 AFTERCARE FOLLOWING RIGHT KNEE JOINT REPLACEMENT SURGERY: Primary | ICD-10-CM

## 2023-10-26 RX ORDER — CEPHALEXIN 500 MG/1
2000 CAPSULE ORAL
COMMUNITY
Start: 2023-07-25

## 2023-10-31 ENCOUNTER — HOSPITAL ENCOUNTER (OUTPATIENT)
Dept: RADIOLOGY | Facility: HOSPITAL | Age: 63
Discharge: HOME | End: 2023-10-31
Attending: ORTHOPAEDIC SURGERY
Payer: COMMERCIAL

## 2023-10-31 ENCOUNTER — OFFICE VISIT (OUTPATIENT)
Dept: OBGYN CLINIC | Facility: HOSPITAL | Age: 63
End: 2023-10-31
Payer: COMMERCIAL

## 2023-10-31 VITALS
DIASTOLIC BLOOD PRESSURE: 83 MMHG | HEIGHT: 63 IN | SYSTOLIC BLOOD PRESSURE: 146 MMHG | BODY MASS INDEX: 30.11 KG/M2 | HEART RATE: 89 BPM

## 2023-10-31 DIAGNOSIS — Z47.1 AFTERCARE FOLLOWING RIGHT KNEE JOINT REPLACEMENT SURGERY: ICD-10-CM

## 2023-10-31 DIAGNOSIS — Z96.651 HISTORY OF TOTAL KNEE ARTHROPLASTY, RIGHT: Primary | ICD-10-CM

## 2023-10-31 DIAGNOSIS — Z96.651 AFTERCARE FOLLOWING RIGHT KNEE JOINT REPLACEMENT SURGERY: ICD-10-CM

## 2023-10-31 PROCEDURE — 99213 OFFICE O/P EST LOW 20 MIN: CPT | Performed by: ORTHOPAEDIC SURGERY

## 2023-10-31 PROCEDURE — 73560 X-RAY EXAM OF KNEE 1 OR 2: CPT

## 2023-10-31 NOTE — PROGRESS NOTES
Assessment:  1. History of total knee arthroplasty, right        Right TKA 5/01/23    Plan:  Patient is progressing nicely 6 mos s/p right TKA  Continue activities as tolerated  Continue antibiotics prior to dental work and cleanings until 2 years PO. To do next visit:  Return in about 6 months (around 4/30/2024) for x-rays. The above stated was discussed in layman's terms and the patient expressed understanding. All questions were answered to the patient's satisfaction. Subjective:   Abel Cruz is a 70 y.o. female who presents 6 mos s/p right total knee arthroplasty 5/01/23. Patient states overall she is doing well and denies any pain today. Patient reports her proximal incision is itchy. Past Medical History:   Diagnosis Date    Disease of thyroid gland     GERD (gastroesophageal reflux disease)        Past Surgical History:   Procedure Laterality Date    ANTERIOR CRUCIATE LIGAMENT REPAIR Bilateral     COLONOSCOPY  2017    normal    JOINT REPLACEMENT      [place] ARTHRP KNE CONDYLE&PLATU MEDIAL&LAT COMPARTMENTS Right 5/1/2023    Procedure: ARTHROPLASTY KNEE TOTAL W ROBOT;  Surgeon: Pauline Stanley MD;  Location: [facility];  Service: Orthopedics       Family History   Problem Relation Age of Onset    No Known Problems Mother     Prostate cancer Father         62s or 76s    Lymphoma Sister     No Known Problems Sister     No Known Problems Sister     No Known Problems Daughter     Breast cancer Maternal Grandmother 48    No Known Problems Maternal Grandfather     Breast cancer Paternal Grandmother 61        in her 62s    No Known Problems Paternal Grandfather     No Known Problems Paternal Aunt        Social History     Occupational History    Not on file   Tobacco Use    Smoking status: Never    Smokeless tobacco: Never   Vaping Use    Vaping Use: Never used   Substance and Sexual Activity    Alcohol use:  Yes     Alcohol/week: 1.0 standard drink of alcohol     Types: 1 Glasses of wine per week     Comment: occassional    Drug use: Never    Sexual activity: Yes     Partners: Male         Current Outpatient Medications:     cephalexin (KEFLEX) 500 mg capsule, Take 2,000 mg by mouth 60 minutes pre-procedure Prior to dental procedures, Disp: , Rfl:     cholecalciferol (VITAMIN D3) 25 mcg (1,000 units) tablet, , Disp: , Rfl:     [employer] Covid-19 At Home Test Kit KIT, FOLLOW INSTRUCTIONS INCLUDED WITH THE PACKAGE., Disp: , Rfl:     levothyroxine 75 mcg tablet, Take 75 mcg by mouth daily, Disp: , Rfl:     multivitamin (THERAGRAN) TABS, Take 1 tablet by mouth daily, Disp: , Rfl:     omeprazole (PriLOSEC) 40 MG capsule, Take 40 mg by mouth daily, Disp: , Rfl:     Rhubarb (ESTROVEN COMPLETE PO), Take by mouth in the morning, Disp: , Rfl:     Allergies   Allergen Reactions    Dilaudid [Hydromorphone] Vomiting     Okay to give oxy     Morphine Nausea Only    Sulfa Antibiotics Hives    Sodium Metabisulfite Rash    Sulfacetamide Sodium-Sulfur Rash         Objective:  Vitals:    10/31/23 1005   BP: 146/83   Pulse: 89       Review of Systems   Constitutional:  Negative for chills and fever. HENT:  Negative for drooling and sneezing. Eyes:  Negative for redness. Respiratory:  Negative for cough and wheezing. Gastrointestinal:  Negative for nausea and vomiting. Musculoskeletal:         Please see ortho exam   Psychiatric/Behavioral:  Negative for behavioral problems. The patient is not nervous/anxious. Right Knee Exam     Tenderness   The patient is experiencing no tenderness. Range of Motion   Extension:  0   Flexion:  120     Tests   Varus: negative Valgus: negative    Other   Erythema: absent  Sensation: normal  Pulse: present  Swelling: none  Effusion: no effusion present    Comments: Well healed incision             Physical Exam  Vitals reviewed. Constitutional:       Appearance: She is well-developed. Eyes:      Pupils: Pupils are equal, round, and reactive to light.    Pulmonary: Effort: Pulmonary effort is normal.      Breath sounds: Normal breath sounds. Abdominal:      General: Abdomen is flat. There is no distension. Musculoskeletal:      Right knee: No effusion. Skin:     General: Skin is warm and dry. Neurological:      Mental Status: She is alert and oriented to person, place, and time. Psychiatric:         Behavior: Behavior normal.         Thought Content: Thought content normal.         Judgment: Judgment normal.           Diagnostics, reviewed and taken today if performed as documented: The attending physician has personally reviewed the pertinent films in PACS and interpretation is as follows:  Right knee x-rays demonstrates no fracture or dislocation, prothesis remains in acceptable alignment and position, no signs of loosening. Procedures, if performed today:    Procedures    None performed        Scribe Attestation      I,:  Fidelina Oreilly am acting as a scribe while in the presence of the attending physician.:       I,:  Jose Eduardo Enamorado MD personally performed the services described in this documentation    as scribed in my presence.:                 Portions of the record may have been created with voice recognition software. Occasional wrong word or "sound a like" substitutions may have occurred due to the inherent limitations of voice recognition software. Read the chart carefully and recognize, using context, where substitutions have occurred.

## 2024-04-17 DIAGNOSIS — Z47.1 AFTERCARE FOLLOWING RIGHT KNEE JOINT REPLACEMENT SURGERY: Primary | ICD-10-CM

## 2024-04-17 DIAGNOSIS — Z96.651 AFTERCARE FOLLOWING RIGHT KNEE JOINT REPLACEMENT SURGERY: Primary | ICD-10-CM

## 2024-04-30 ENCOUNTER — OFFICE VISIT (OUTPATIENT)
Dept: OBGYN CLINIC | Facility: HOSPITAL | Age: 64
End: 2024-04-30
Payer: COMMERCIAL

## 2024-04-30 ENCOUNTER — HOSPITAL ENCOUNTER (OUTPATIENT)
Dept: RADIOLOGY | Facility: HOSPITAL | Age: 64
Discharge: HOME | End: 2024-04-30
Attending: ORTHOPAEDIC SURGERY
Payer: COMMERCIAL

## 2024-04-30 VITALS
HEIGHT: 63 IN | HEART RATE: 82 BPM | BODY MASS INDEX: 30.11 KG/M2 | DIASTOLIC BLOOD PRESSURE: 91 MMHG | SYSTOLIC BLOOD PRESSURE: 167 MMHG

## 2024-04-30 DIAGNOSIS — Z47.1 AFTERCARE FOLLOWING RIGHT KNEE JOINT REPLACEMENT SURGERY: Primary | ICD-10-CM

## 2024-04-30 DIAGNOSIS — Z96.651 AFTERCARE FOLLOWING RIGHT KNEE JOINT REPLACEMENT SURGERY: ICD-10-CM

## 2024-04-30 DIAGNOSIS — Z96.651 AFTERCARE FOLLOWING RIGHT KNEE JOINT REPLACEMENT SURGERY: Primary | ICD-10-CM

## 2024-04-30 DIAGNOSIS — M70.51 PES ANSERINUS BURSITIS OF RIGHT KNEE: ICD-10-CM

## 2024-04-30 DIAGNOSIS — Z47.1 AFTERCARE FOLLOWING RIGHT KNEE JOINT REPLACEMENT SURGERY: ICD-10-CM

## 2024-04-30 PROCEDURE — 99213 OFFICE O/P EST LOW 20 MIN: CPT | Performed by: ORTHOPAEDIC SURGERY

## 2024-04-30 PROCEDURE — 73560 X-RAY EXAM OF KNEE 1 OR 2: CPT

## 2024-04-30 PROCEDURE — 1159F MED LIST DOCD IN RCRD: CPT | Performed by: ORTHOPAEDIC SURGERY

## 2024-04-30 PROCEDURE — 20610 DRAIN/INJ JOINT/BURSA W/O US: CPT | Performed by: ORTHOPAEDIC SURGERY

## 2024-04-30 RX ORDER — LIDOCAINE HYDROCHLORIDE 10 MG/ML
2 INJECTION, SOLUTION INFILTRATION; PERINEURAL
Status: COMPLETED | OUTPATIENT
Start: 2024-04-30 | End: 2024-04-30

## 2024-04-30 RX ORDER — BETAMETHASONE SODIUM PHOSPHATE AND BETAMETHASONE ACETATE 3; 3 MG/ML; MG/ML
12 INJECTION, SUSPENSION INTRA-ARTICULAR; INTRALESIONAL; INTRAMUSCULAR; SOFT TISSUE
Status: COMPLETED | OUTPATIENT
Start: 2024-04-30 | End: 2024-04-30

## 2024-04-30 RX ORDER — BUPIVACAINE HYDROCHLORIDE 2.5 MG/ML
2 INJECTION, SOLUTION INFILTRATION; PERINEURAL
Status: COMPLETED | OUTPATIENT
Start: 2024-04-30 | End: 2024-04-30

## 2024-04-30 RX ADMIN — BUPIVACAINE HYDROCHLORIDE 2 ML: 2.5 INJECTION, SOLUTION INFILTRATION; PERINEURAL at 10:30

## 2024-04-30 RX ADMIN — BETAMETHASONE SODIUM PHOSPHATE AND BETAMETHASONE ACETATE 12 MG: 3; 3 INJECTION, SUSPENSION INTRA-ARTICULAR; INTRALESIONAL; INTRAMUSCULAR; SOFT TISSUE at 10:30

## 2024-04-30 RX ADMIN — LIDOCAINE HYDROCHLORIDE 2 ML: 10 INJECTION, SOLUTION INFILTRATION; PERINEURAL at 10:30

## 2024-04-30 NOTE — PROGRESS NOTES
Assessment:  1. Aftercare following right knee joint replacement surgery            Plan:  12 months s/p right TKA with robot, 5/1/2023  Pes anserine bursitis  Patient encouraged to remain active  The patient is counseled on prophylactic antibiotic use prior to dental visits.  The patient was provided with right pes anserine steroid injection.  The patient tolerated the procedure well.  The patient can follow up as needed and is welcome to return at any point with any new or old issue.        To do next visit:  Return if symptoms worsen or fail to improve.    The above stated was discussed in layman's terms and the patient expressed understanding.  All questions were answered to the patient's satisfaction.       Scribe Attestation      I,:  Garcia Dumont am acting as a scribe while in the presence of the attending physician.:       I,:  Basil Alan MD personally performed the services described in this documentation    as scribed in my presence.:               Subjective:   Swetha Escalona is a 64 y.o. female who presents 12 months s/p right TKA with robot, 5/1/2023.  She had been well with recent increase of symptoms.  Today she complains of right anteromedial and posterior knee pain while biking.  She continues to exercise including pilates, yoga and recumbent bike.   She denies medications for this issue.  He denies fever, chills or shortness of breath.        Review of systems negative unless otherwise specified in HPI    Past Medical History:   Diagnosis Date    Disease of thyroid gland     GERD (gastroesophageal reflux disease)        Past Surgical History:   Procedure Laterality Date    ANTERIOR CRUCIATE LIGAMENT REPAIR Bilateral     COLONOSCOPY  2017    normal    JOINT REPLACEMENT      [place] ARTHRP KNE CONDYLE&PLATU MEDIAL&LAT COMPARTMENTS Right 5/1/2023    Procedure: ARTHROPLASTY KNEE TOTAL W ROBOT;  Surgeon: Basil Alan MD;  Location: [facility];  Service: Orthopedics       Family  History   Problem Relation Age of Onset    No Known Problems Mother     Prostate cancer Father         60s or 70s    Lymphoma Sister     No Known Problems Sister     No Known Problems Sister     No Known Problems Daughter     Breast cancer Maternal Grandmother 50    No Known Problems Maternal Grandfather     Breast cancer Paternal Grandmother 60        in her 60s    No Known Problems Paternal Grandfather     No Known Problems Paternal Aunt        Social History     Occupational History    Not on file   Tobacco Use    Smoking status: Never    Smokeless tobacco: Never   Vaping Use    Vaping status: Never Used   Substance and Sexual Activity    Alcohol use: Yes     Alcohol/week: 1.0 standard drink of alcohol     Types: 1 Glasses of wine per week     Comment: occassional    Drug use: Never    Sexual activity: Yes     Partners: Male         Current Outpatient Medications:     cephalexin (KEFLEX) 500 mg capsule, Take 2,000 mg by mouth 60 minutes pre-procedure Prior to dental procedures, Disp: , Rfl:     cholecalciferol (VITAMIN D3) 25 mcg (1,000 units) tablet, , Disp: , Rfl:     [employer] Covid-19 At Home Test Kit KIT, FOLLOW INSTRUCTIONS INCLUDED WITH THE PACKAGE., Disp: , Rfl:     levothyroxine 75 mcg tablet, Take 75 mcg by mouth daily, Disp: , Rfl:     multivitamin (THERAGRAN) TABS, Take 1 tablet by mouth daily, Disp: , Rfl:     omeprazole (PriLOSEC) 40 MG capsule, Take 40 mg by mouth daily, Disp: , Rfl:     Rhubarb (ESTROVEN COMPLETE PO), Take by mouth in the morning, Disp: , Rfl:     Allergies   Allergen Reactions    Dilaudid [Hydromorphone] Vomiting     Okay to give oxy     Morphine Nausea Only    Sulfa Antibiotics Hives    Sodium Metabisulfite Rash    Sulfacetamide Sodium-Sulfur Rash            Vitals:    04/30/24 1007   BP: 167/91   Pulse: 82       Objective:  Physical exam  General: Awake, Alert, Oriented  Eyes: Pupils equal, round and reactive to light  Heart: regular rate and rhythm  Lungs: No audible  "wheezing  Abdomen: soft                    Ortho Exam  Right knee:  TTP over pes anserine  Well healed anterior incision  No erythema and no ecchymosis  Stable to varus and valgus stress  Extensor mechanism intact  Extension 0  Flexion 120  Calf compartments soft and supple  Sensation intact  Toes are warm sensate and mobile      Diagnostics, reviewed and taken today if performed as documented:    The attending physician has personally reviewed the pertinent films in PACS and interpretation is as follows:  Right knee x-ray:  Well aligned prosthesis with no acute changes.      Procedures, if performed today:    Large joint arthrocentesis: R pes anserine bursa  Universal Protocol:  Consent: Verbal consent obtained.  Risks and benefits: risks, benefits and alternatives were discussed  Consent given by: patient  Time out: Immediately prior to procedure a \"time out\" was called to verify the correct patient, procedure, equipment, support staff and site/side marked as required.  Timeout called at: 4/30/2024 11:10 AM.  Patient understanding: patient states understanding of the procedure being performed  Site marked: the operative site was marked  Patient identity confirmed: verbally with patient  Supporting Documentation  Indications: pain   Procedure Details  Location: knee - R pes anserine bursa  Preparation: Patient was prepped and draped in the usual sterile fashion  Needle size: 22 G  Ultrasound guidance: no  Approach: anterolateral  Medications administered: 12 mg betamethasone acetate-betamethasone sodium phosphate 6 (3-3) mg/mL; 2 mL bupivacaine 0.25 %; 2 mL lidocaine 1 %    Patient tolerance: patient tolerated the procedure well with no immediate complications  Dressing:  Sterile dressing applied            Portions of the record may have been created with voice recognition software.  Occasional wrong word or \"sound a like\" substitutions may have occurred due to the inherent limitations of voice recognition " software.  Read the chart carefully and recognize, using context, where substitutions have occurred.

## 2024-06-04 ENCOUNTER — HOSPITAL ENCOUNTER (OUTPATIENT)
Dept: RADIOLOGY | Age: 64
Discharge: HOME | End: 2024-06-04
Payer: COMMERCIAL

## 2024-06-04 VITALS — HEIGHT: 63 IN | BODY MASS INDEX: 30.12 KG/M2 | WEIGHT: 170 LBS

## 2024-06-04 DIAGNOSIS — Z12.31 VISIT FOR SCREENING MAMMOGRAM: ICD-10-CM

## 2024-06-04 PROCEDURE — 77067 SCR MAMMO BI INCL CAD: CPT

## 2024-06-04 PROCEDURE — 77063 BREAST TOMOSYNTHESIS BI: CPT

## 2025-06-09 ENCOUNTER — HOSPITAL ENCOUNTER (OUTPATIENT)
Dept: RADIOLOGY | Age: 65
Discharge: HOME | End: 2025-06-09
Payer: COMMERCIAL

## 2025-06-09 VITALS — HEIGHT: 63 IN | WEIGHT: 170 LBS | BODY MASS INDEX: 30.12 KG/M2

## 2025-06-09 DIAGNOSIS — Z12.31 VISIT FOR SCREENING MAMMOGRAM: ICD-10-CM

## 2025-06-09 PROCEDURE — 77063 BREAST TOMOSYNTHESIS BI: CPT

## 2025-06-09 PROCEDURE — 77067 SCR MAMMO BI INCL CAD: CPT

## (undated) DEVICE — HOOD WITH PEEL AWAY FACE SHIELD: Brand: T7PLUS

## (undated) DEVICE — SILVER-COATED ANTIMICROBIAL BARRIER DRESSING: Brand: ACTICOAT   4" X 8"

## (undated) DEVICE — JP 3-SPRING RES W/10FR PVC DRAIN/TR: Brand: CARDINAL HEALTH

## (undated) DEVICE — VELYS SATEL DRAPE

## (undated) DEVICE — 3 BONE CEMENT MIXER: Brand: MIXEVAC

## (undated) DEVICE — HEAVY DUTY TABLE COVER: Brand: CONVERTORS

## (undated) DEVICE — DRAPE EQUIPMENT RF WAND

## (undated) DEVICE — PLUMEPEN PRO 10FT

## (undated) DEVICE — STOCKINETTE REGULAR

## (undated) DEVICE — SILVER-COATED ANTIBACTERIAL BARRIER DRESSING: Brand: ACTICOAT SURGIC 10X20CM 5PK US

## (undated) DEVICE — VELYS ROBOTIC-ASSISTED SOLUTION ARRAY SET - KNEE: Brand: VELYS

## (undated) DEVICE — SPONGE PVP SCRUB WING STERILE

## (undated) DEVICE — VELYS ROBOT DRAPE

## (undated) DEVICE — RECIPROCATING BLADE, DOUBLE SIDED, OFFSET  (70.0 X 0.64 X 12.6MM)

## (undated) DEVICE — PADDING CAST 4 IN  COTTON STRL

## (undated) DEVICE — ACE WRAP 6 IN STERILE

## (undated) DEVICE — COOL TEMP PAD

## (undated) DEVICE — ACE WRAP 6 IN UNSTERILE

## (undated) DEVICE — TRAY FOLEY 16FR URIMETER SURESTEP

## (undated) DEVICE — SUT VICRYL PLUS 1 CTB-1 36 IN VCPB947H

## (undated) DEVICE — ABDOMINAL PAD: Brand: DERMACEA

## (undated) DEVICE — VELYS BLADE SAW OSC 85 X 19 X 2MM

## (undated) DEVICE — VELYS ROBOT-ASSISTED SOLUTION ARRAY DRILL PIN 125 MM X 4 MM: Brand: VELYS

## (undated) DEVICE — BETHLEHEM UNIV TOTAL KNEE, KIT: Brand: CARDINAL HEALTH

## (undated) DEVICE — DRAPE SHEET X-LG

## (undated) DEVICE — STERILE PITCHER PACK: Brand: CARDINAL HEALTH

## (undated) DEVICE — NO-SCRATCH ™ SMALL WHITNEY CURETTE ™ IS A SINGLE-USE, PLASTIC CURETTE FOR QUICKLY APPLYING, MANIPULATING AND REMOVING BONE CEMENT DURING HIP AND KNEE REPLACEMENT SURGERY. THE PLASTIC IS SOFTER THAN STEEL INSTRUMENTS, REDUCING THE RISK OF DAMAGING THE PROSTHESIS WITH METAL INSTRUMENTS.  THE CURETTE’S 6MM TIP REMOVES EXCESS CEMENT FROM REPLACEMENT HIPS AND KNEES. EASY-TO-MANEUVER, THE SMALL BLUE CURETTE LETS YOU REMOVE CEMENT FROM ALL EDGES OF THE PROSTHESIS.NO-SCRATCH WHITNEY SMALL CURETTE FEATURES:SAFER THAN STEEL- MADE OF PLASTIC - STURDY YET SOFTER THAN SURGICAL STEEL.HANDIER- EACH TOOL HAS A MOLDED-IN THUMB INDENTATION INSTANTLY ORIENTING THE TOOL.- EASIER TO MANEUVER IN HARD TO SEE PLACES.- COLOR-CODED FOR EASY IDENTIFICATION.FASTER- COMES INDIVIDUALLY PACKAGED IN STERILE, PEEL OPEN POUCH, READY TO GO.- APPLIES, MANIPULATES, OR REMOVES CEMENT WITH FINGERTIP PRECISION.ECONOMICAL- THE COST OF A SINGLE REVISION DWARFS THE COST OF A SINGLE-USE CURETTE. - DISPOSABLE – THERE’S NO NEED TO WASTE TIME REMOVING HARDENED CEMENT OR RE-STERILIZING TOOLS.- LESS EXPENSIVE TO BUY AND INVENTORY - ORDER ONLY THE TOOL YOU USE.- PACKAGED 25 INDIVIDUALLY WRAPPED TOOLS TO A CARTON FOR CONVENIENT SHELF STORAGE.: Brand: WHITNEY NO-SCRATCH CURETTE (SMALL)

## (undated) DEVICE — DRAPE SHEET THREE QUARTER

## (undated) DEVICE — DUAL CUT SAGITTAL BLADE

## (undated) DEVICE — SUT VICRYL PLUS 2-0 CTB-1 27 IN VCPB259H

## (undated) DEVICE — GLOVE INDICATOR PI UNDERGLOVE SZ 8.5 BLUE

## (undated) DEVICE — HANDPIECE SET WITH RETRACTABLE COAXIAL FAN SPRAY TIP AND SUCTION TUBE: Brand: INTERPULSE

## (undated) DEVICE — GAUZE SPONGES,16 PLY: Brand: CURITY

## (undated) DEVICE — GLOVE SRG BIOGEL 8

## (undated) DEVICE — HOOD: Brand: T7PLUS

## (undated) DEVICE — 3M™ IOBAN™ 2 ANTIMICROBIAL INCISE DRAPE 6650EZ: Brand: IOBAN™ 2

## (undated) DEVICE — CAPIT KNEE ATTUNE FB CEMENT -DEPUY